# Patient Record
Sex: FEMALE | HISPANIC OR LATINO | Employment: OTHER | URBAN - METROPOLITAN AREA
[De-identification: names, ages, dates, MRNs, and addresses within clinical notes are randomized per-mention and may not be internally consistent; named-entity substitution may affect disease eponyms.]

---

## 2017-06-08 ENCOUNTER — PATIENT OUTREACH (OUTPATIENT)
Dept: HEALTH INFORMATION MANAGEMENT | Facility: OTHER | Age: 72
End: 2017-06-08

## 2021-01-14 DIAGNOSIS — Z23 NEED FOR VACCINATION: ICD-10-CM

## 2024-06-13 ENCOUNTER — OFFICE VISIT (OUTPATIENT)
Dept: MEDICAL GROUP | Age: 79
End: 2024-06-13
Payer: MEDICARE

## 2024-06-13 VITALS
HEIGHT: 64 IN | SYSTOLIC BLOOD PRESSURE: 140 MMHG | DIASTOLIC BLOOD PRESSURE: 60 MMHG | WEIGHT: 122 LBS | HEART RATE: 56 BPM | OXYGEN SATURATION: 99 % | BODY MASS INDEX: 20.83 KG/M2 | TEMPERATURE: 97.3 F

## 2024-06-13 DIAGNOSIS — Z00.00 BLOOD TESTS FOR ROUTINE GENERAL PHYSICAL EXAMINATION: ICD-10-CM

## 2024-06-13 DIAGNOSIS — Z78.0 POSTMENOPAUSAL STATUS (AGE-RELATED) (NATURAL): ICD-10-CM

## 2024-06-13 DIAGNOSIS — N95.1 MENOPAUSAL STATE: ICD-10-CM

## 2024-06-13 DIAGNOSIS — I10 ESSENTIAL HYPERTENSION: ICD-10-CM

## 2024-06-13 DIAGNOSIS — M19.90 ARTHRITIS: ICD-10-CM

## 2024-06-13 DIAGNOSIS — D33.3 SCHWANNOMA OF CRANIAL NERVE (HCC): ICD-10-CM

## 2024-06-13 DIAGNOSIS — Z11.59 NEED FOR HEPATITIS C SCREENING TEST: ICD-10-CM

## 2024-06-13 DIAGNOSIS — E78.2 MIXED HYPERLIPIDEMIA: ICD-10-CM

## 2024-06-13 DIAGNOSIS — C16.8 MALIGNANT NEOPLASM OF OVERLAPPING SITES OF STOMACH (HCC): ICD-10-CM

## 2024-06-13 PROCEDURE — 3077F SYST BP >= 140 MM HG: CPT | Performed by: STUDENT IN AN ORGANIZED HEALTH CARE EDUCATION/TRAINING PROGRAM

## 2024-06-13 PROCEDURE — 99204 OFFICE O/P NEW MOD 45 MIN: CPT | Performed by: STUDENT IN AN ORGANIZED HEALTH CARE EDUCATION/TRAINING PROGRAM

## 2024-06-13 PROCEDURE — 3078F DIAST BP <80 MM HG: CPT | Performed by: STUDENT IN AN ORGANIZED HEALTH CARE EDUCATION/TRAINING PROGRAM

## 2024-06-13 ASSESSMENT — ENCOUNTER SYMPTOMS
ORTHOPNEA: 0
BLOOD IN STOOL: 0
DEPRESSION: 0
DOUBLE VISION: 0
NECK PAIN: 0
HEADACHES: 0
MYALGIAS: 0
COUGH: 0
ABDOMINAL PAIN: 0
HEARTBURN: 0
BACK PAIN: 0
CHILLS: 0
FEVER: 0
BRUISES/BLEEDS EASILY: 0
WHEEZING: 0
DIZZINESS: 0
SHORTNESS OF BREATH: 0
BLURRED VISION: 0
WEAKNESS: 0
PALPITATIONS: 0

## 2024-06-13 ASSESSMENT — PATIENT HEALTH QUESTIONNAIRE - PHQ9: CLINICAL INTERPRETATION OF PHQ2 SCORE: 0

## 2024-06-13 NOTE — PROGRESS NOTES
Subjective:     CC: Establish care      HISTORY OF THE PRESENT ILLNESS:   Patient is a 78 y.o. female. This pleasant patient is here today to establish care and discuss screening, immunizations and chronic medical conditions.  Patient has a past medical history of schwannoma status post resection in 2017, gastric cancer status post partial gastrectomy and radiation in 2021, hypertension currently treated with diet and exercise, osteoarthritis of hands and knees, mixed dyslipidemia currently not taking any medical therapy, and impaired fasting glucose.  Patient states that she was seen previously by medical group for many years, however she moved to [facility] several years ago and she came back to the  for permanent residency.  The last time she was seen by her previous PCP within [facility] group was in 2016.  She states that she was getting medical care she had a, however she has not had done any routine lab work in the past year or so.  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  She also stated that at times she has had joint pains, however this pain is not very frequent and she thought that she had rheumatoid arthritis, however previous testing was negative.  She also was worried about poor nutrition, however her weight is healthy and she has been in this current weight for years.    ROS:   Review of Systems   Constitutional:  Negative for chills, fever and malaise/fatigue.   HENT:  Negative for nosebleeds and tinnitus.    Eyes:  Negative for blurred vision and double vision.   Respiratory:  Negative for cough, shortness of breath and wheezing.    Cardiovascular:  Negative for chest pain, palpitations, orthopnea and leg swelling.   Gastrointestinal:  Negative for abdominal pain, blood in stool, heartburn and  "melena.   Genitourinary:  Negative for dysuria, frequency and urgency.   Musculoskeletal:  Negative for back pain, myalgias and neck pain.   Skin:  Negative for rash.   Neurological:  Negative for dizziness, weakness and headaches.   Endo/Heme/Allergies:  Does not bruise/bleed easily.   Psychiatric/Behavioral:  Negative for depression and suicidal ideas.          Objective:     Exam: BP (!) 146/60 (BP Location: Right arm, Patient Position: Sitting, BP Cuff Size: Adult)   Pulse (!) 56   Temp 36.3 °C (97.3 °F) (Temporal)   Ht 1.613 m (5' 3.5\")   Wt 55.3 kg (122 lb)   SpO2 99%  Body mass index is 21.27 kg/m².    Physical Exam  Vitals reviewed.   Constitutional:       General: She is not in acute distress.  HENT:      Head: Normocephalic and atraumatic.      Right Ear: Tympanic membrane and ear canal normal.      Left Ear: Tympanic membrane and ear canal normal.      Mouth/Throat:      Mouth: Mucous membranes are moist.      Pharynx: No oropharyngeal exudate.   Eyes:      General: No scleral icterus.     Extraocular Movements: Extraocular movements intact.      Pupils: Pupils are equal, round, and reactive to light.   Cardiovascular:      Rate and Rhythm: Normal rate and regular rhythm.      Pulses: Normal pulses.      Heart sounds: Normal heart sounds. No murmur heard.  Pulmonary:      Effort: Pulmonary effort is normal. No respiratory distress.      Breath sounds: Normal breath sounds. No wheezing.   Abdominal:      General: There is no distension.      Palpations: Abdomen is soft.      Tenderness: There is no abdominal tenderness. There is no guarding or rebound.   Musculoskeletal:      Cervical back: Normal range of motion and neck supple.      Right lower leg: No edema.      Left lower leg: No edema.   Lymphadenopathy:      Cervical: No cervical adenopathy.   Skin:     Capillary Refill: Capillary refill takes less than 2 seconds.      Coloration: Skin is not jaundiced.      Findings: No bruising. "   Neurological:      General: No focal deficit present.      Mental Status: She is alert.      Cranial Nerves: No cranial nerve deficit.   Psychiatric:         Mood and Affect: Mood normal.         Behavior: Behavior normal.       Labs: Ordered    Assessment & Plan:   78 y.o. female with the following -    1. Malignant neoplasm of overlapping sites of stomach (HCC)  -Status post radiation and partial gastrectomy in 2021  -In remission  -Currently not following up with oncology    2. Arthritis  -History of mild osteoarthritis in distal phalanges and knees  -In the past worked up for rheumatoid arthritis.  Results were negative  -However patient was told she will that she had rheumatoid arthritis 2 or 3 years ago  -Patient is not having persistent or significant joint pain, she does not have either swollen joints, morning stiffness.  -I have very low suspicious for rheumatoid arthritis diagnosis, however patient and her daughter want to be tested for it    - RHEUMATOID ARTHRITIS FACTOR; Future    3. Essential hypertension  -Chronic, stable  -In the past she used to take losartan 50 mg daily, however she has now been taking this medication for quite some time and apparently has been well-controlled with diet and exercise  -I recommended to the patient to check her blood pressure daily and bring blood pressure log to her next visit  -Blood pressure today at our office was borderline elevated    4. Mixed hyperlipidemia  -Chronic, not treated  -Patient is not taking any medications currently  -No recent labs  -Recommended repeat lipid profile    5. Schwannoma of cranial nerve (HCC)  -S/p resection in 2011  -Patient has significant hearing loss in her left ear secondary to surgery    6. Postmenopausal status (age-related) (natural)    7. Menopausal state  -Due for screening  - DS-BONE DENSITY STUDY (DEXA); Future    8. Blood tests for routine general physical examination  -Appropriate lab work  - CBC WITH DIFFERENTIAL;  Future  - Comp Metabolic Panel; Future  - HEMOGLOBIN A1C; Future  - Lipid Profile; Future  - VITAMIN D,25 HYDROXY (DEFICIENCY); Future  - TSH; Future    9. Need for hepatitis C screening test  -Due for screening  - HEP C VIRUS ANTIBODY; Future     HCC Gap Form    Diagnosis to address: C80.1 - Cancer (HCC)  Assessment and plan: Chronic, stable, as based on today's assessment and impact on other conditions evaluated today. Continue with current treatment plan:  Follow-up with specialist as directed, but at least annually.  Last edited 06/13/24 10:11 PDT by Pranay Lewis M.D.       Return in about 2 weeks (around 6/27/2024) for Labs.    Please note that this dictation was created using voice recognition software. I have made every reasonable attempt to correct obvious errors, but I expect that there are errors of grammar and possibly content that I did not discover before finalizing the note.

## 2024-06-22 ENCOUNTER — HOSPITAL ENCOUNTER (OUTPATIENT)
Facility: MEDICAL CENTER | Age: 79
End: 2024-06-22
Payer: MEDICARE

## 2024-06-22 ENCOUNTER — OFFICE VISIT (OUTPATIENT)
Dept: URGENT CARE | Facility: CLINIC | Age: 79
End: 2024-06-22
Payer: MEDICARE

## 2024-06-22 ENCOUNTER — APPOINTMENT (OUTPATIENT)
Dept: RADIOLOGY | Facility: IMAGING CENTER | Age: 79
End: 2024-06-22
Payer: MEDICARE

## 2024-06-22 VITALS
OXYGEN SATURATION: 97 % | WEIGHT: 124 LBS | SYSTOLIC BLOOD PRESSURE: 168 MMHG | BODY MASS INDEX: 20.66 KG/M2 | RESPIRATION RATE: 16 BRPM | HEART RATE: 67 BPM | DIASTOLIC BLOOD PRESSURE: 104 MMHG | TEMPERATURE: 97.8 F | HEIGHT: 65 IN

## 2024-06-22 DIAGNOSIS — M43.6 TORTICOLLIS, ACUTE: ICD-10-CM

## 2024-06-22 DIAGNOSIS — M54.2 BILATERAL POSTERIOR NECK PAIN: ICD-10-CM

## 2024-06-22 DIAGNOSIS — I10 ELEVATED BLOOD PRESSURE READING IN OFFICE WITH DIAGNOSIS OF HYPERTENSION: ICD-10-CM

## 2024-06-22 DIAGNOSIS — S16.1XXA ACUTE STRAIN OF NECK MUSCLE, INITIAL ENCOUNTER: ICD-10-CM

## 2024-06-22 DIAGNOSIS — S16.1XXA ACUTE STRAIN OF NECK MUSCLE, INITIAL ENCOUNTER: Primary | ICD-10-CM

## 2024-06-22 LAB
ALBUMIN SERPL BCP-MCNC: 3.8 G/DL (ref 3.2–4.9)
ALBUMIN/GLOB SERPL: 1.1 G/DL
ALP SERPL-CCNC: 186 U/L (ref 30–99)
ALT SERPL-CCNC: 15 U/L (ref 2–50)
ANION GAP SERPL CALC-SCNC: 12 MMOL/L (ref 7–16)
AST SERPL-CCNC: 19 U/L (ref 12–45)
BILIRUB SERPL-MCNC: 0.2 MG/DL (ref 0.1–1.5)
BUN SERPL-MCNC: 13 MG/DL (ref 8–22)
CALCIUM ALBUM COR SERPL-MCNC: 9.4 MG/DL (ref 8.5–10.5)
CALCIUM SERPL-MCNC: 9.2 MG/DL (ref 8.5–10.5)
CHLORIDE SERPL-SCNC: 104 MMOL/L (ref 96–112)
CO2 SERPL-SCNC: 24 MMOL/L (ref 20–33)
CREAT SERPL-MCNC: 0.7 MG/DL (ref 0.5–1.4)
GFR SERPLBLD CREATININE-BSD FMLA CKD-EPI: 87 ML/MIN/1.73 M 2
GLOBULIN SER CALC-MCNC: 3.6 G/DL (ref 1.9–3.5)
GLUCOSE SERPL-MCNC: 101 MG/DL (ref 65–99)
POTASSIUM SERPL-SCNC: 4.4 MMOL/L (ref 3.6–5.5)
PROT SERPL-MCNC: 7.4 G/DL (ref 6–8.2)
SODIUM SERPL-SCNC: 140 MMOL/L (ref 135–145)

## 2024-06-22 PROCEDURE — 3077F SYST BP >= 140 MM HG: CPT

## 2024-06-22 PROCEDURE — 72040 X-RAY EXAM NECK SPINE 2-3 VW: CPT | Mod: TC

## 2024-06-22 PROCEDURE — 99214 OFFICE O/P EST MOD 30 MIN: CPT

## 2024-06-22 PROCEDURE — 80053 COMPREHEN METABOLIC PANEL: CPT

## 2024-06-22 PROCEDURE — 3080F DIAST BP >= 90 MM HG: CPT

## 2024-06-22 RX ORDER — TIZANIDINE 4 MG/1
4 TABLET ORAL 3 TIMES DAILY
Qty: 30 TABLET | Refills: 0 | Status: SHIPPED | OUTPATIENT
Start: 2024-06-22 | End: 2024-07-02

## 2024-06-22 ASSESSMENT — ENCOUNTER SYMPTOMS
EYE PAIN: 0
DIZZINESS: 0
PHOTOPHOBIA: 0
WHEEZING: 0
EYE DISCHARGE: 0
FEVER: 0
HEADACHES: 0
SHORTNESS OF BREATH: 0
ABDOMINAL PAIN: 0
PALPITATIONS: 0
BLURRED VISION: 0
EYE REDNESS: 0
SPUTUM PRODUCTION: 0
MYALGIAS: 0
CHILLS: 0
DOUBLE VISION: 0
NAUSEA: 0
DIARRHEA: 0
VOMITING: 0
FALLS: 0
NECK PAIN: 1
STRIDOR: 0
COUGH: 0
SORE THROAT: 0

## 2024-06-22 NOTE — LETTER
June 22, 2024    To Whom It May Concern:         This is confirmation that Kathy Carvajal attended her scheduled appointment with CYNTHIA Rayo on 6/22/24.  Her daughter Sydni brought her into clinic today, and Sydni will need to be excused from work today due to this.         If you have any questions please do not hesitate to call me at the phone number listed below.    Sincerely,          ERAN Rayo.  [phone]

## 2024-06-22 NOTE — PROGRESS NOTES
Subjective:   Kathy Carvajal is a 79 y.o. female who presents for Muscle Pain (X 2 weeks/ can't move neck/ pain on sides and back on neck/ hard to sleeping/ hypertension/ high BMP)          I introduced myself to the patient and informed them that I am a Family Nurse Practitioner.    Entire appointment was completed with ipad  Elijah [id number]    HPI:Kathy is an 79-year-old female who comes in today accompanied by her daughter, with c/o neck pain and muscle spasms. Onset was 2 weeks ago.,  States she was getting into her grandsons truck, reached up to hold the assist bar when she was getting into the truck, lost her footing and ended up swinging momentarily while holding onto the bar, feels like she stretched her neck at that time.  She states over the next 2 days the pain started getting worse, has persisted and has worsened to the point where she now cannot turn her head left or right without getting severe muscle spasms of the back of her neck.  Patient describes symptoms as constant. They describe the pain as sharp, muscle spasms. Aggravating factors include moving her head. Relieving factors include rest and lying flat on her back. Treatments tried at home include none. They describe their symptoms as moderate to severe.  Elevated blood pressure in clinic today, she states she has not been taking her Losartan because she thinks it has been giving her the muscle cramps.  She denies any other fall or trauma/injury that could account for her symptoms.      Review of Systems   Constitutional:  Negative for chills, fever and malaise/fatigue.   HENT:  Negative for congestion, ear pain and sore throat.    Eyes:  Negative for blurred vision, double vision, photophobia, pain, discharge and redness.   Respiratory:  Negative for cough, sputum production, shortness of breath, wheezing and stridor.    Cardiovascular:  Negative for chest pain, palpitations and leg swelling.   Gastrointestinal:  Negative for  "abdominal pain, diarrhea, nausea and vomiting.   Genitourinary:  Negative for dysuria.   Musculoskeletal:  Positive for neck pain. Negative for falls and myalgias.   Skin:  Negative for rash.   Neurological:  Negative for dizziness and headaches.       Medications: This patient does not have an active medication from one of the medication groupers.     Allergies: Nkda [no known drug allergy]    Problem List: does not have any pertinent problems on file.    Surgical History:  Past Surgical History:   Procedure Laterality Date    GASTROSCOPY WITH APC  6/6/08    Performed by EVELINE SUAREZ at SURGERY [facility]    EGD W/ENDOSCOPIC ULTRASOUND  6/6/08    Performed by EVELINE SUAREZ at [facility]    EYE SURGERY  2008    OTHER  7/07    tumors removed both eyes    CHOLECYSTECTOMY  6/07    OTHER      cataract surgery.     PRIMARY C SECTION         Past Social Hx:   reports that she quit smoking about 31 years ago. Her smoking use included cigarettes. She started smoking about 33 years ago. She has a 2 pack-year smoking history. She has never used smokeless tobacco. She reports that she does not drink alcohol and does not use drugs.     Past Family Hx:   family history includes Cancer in her brother, father, and another family member; Lung Disease in her mother.     Problem list, medications, and allergies reviewed by myself today in Epic.   I have documented what I find to be significant in regards to past medical, social, family and surgical history  in my HPI or under PMH/PSH/FH review section, otherwise it is noncontributory     Objective:     BP (!) 168/104   Pulse 67   Temp 36.6 °C (97.8 °F)   Resp 16   Ht 1.651 m (5' 5\")   Wt 56.2 kg (124 lb)   SpO2 97%   BMI 20.63 kg/m²     During this visit, appropriate PPE was worn, and hand hygiene was performed.    Physical Exam  Vitals reviewed.   Constitutional:       General: She is not in acute distress.     Appearance: Normal " appearance. She is normal weight. She is not ill-appearing or toxic-appearing.   HENT:      Head: Normocephalic and atraumatic.      Right Ear: Tympanic membrane, ear canal and external ear normal. There is no impacted cerumen.      Left Ear: Tympanic membrane, ear canal and external ear normal. There is no impacted cerumen.      Nose: No congestion or rhinorrhea.   Eyes:      General: No scleral icterus.        Right eye: No discharge.         Left eye: No discharge.      Extraocular Movements: Extraocular movements intact.      Conjunctiva/sclera: Conjunctivae normal.   Neck:      Thyroid: No thyroid mass, thyromegaly or thyroid tenderness.      Vascular: No carotid bruit.      Trachea: Trachea normal.   Cardiovascular:      Rate and Rhythm: Normal rate.   Pulmonary:      Effort: Pulmonary effort is normal.   Abdominal:      General: There is no distension.   Genitourinary:     Comments: Deferred  Musculoskeletal:         General: No swelling.      Cervical back: Rigidity, torticollis and tenderness present. No edema, erythema, signs of trauma or crepitus. Pain with movement and muscular tenderness present. No spinous process tenderness. Decreased range of motion.      Right lower leg: No edema.      Left lower leg: No edema.   Lymphadenopathy:      Cervical: No cervical adenopathy.   Skin:     General: Skin is warm and dry.   Neurological:      General: No focal deficit present.      Mental Status: She is alert and oriented to person, place, and time. Mental status is at baseline.   Psychiatric:         Mood and Affect: Mood normal.         Behavior: Behavior normal.       RADIOLOGY RESULTS   DX-CERVICAL SPINE-2 OR 3 VIEWS    Result Date: 6/22/2024 6/22/2024 11:32 AM HISTORY/REASON FOR EXAM:  Neck pain. TECHNIQUE/EXAM DESCRIPTION AND NUMBER OF VIEWS: Cervical spine series, 2 views. COMPARISON:  None. FINDINGS: Preserved cervical lordosis. No acute fracture or traumatic listhesis. Minimal cervical spondylosis,  most pronounced at C5-C7 levels. Mild bilateral facet hypertrophy. No acute soft tissue abnormality is identified.     Minimal cervical spondylosis. No acute fracture or listhesis in the cervical spine.          Hospital Outpatient Visit on 06/22/2024   Component Date Value Ref Range Status    Sodium 06/22/2024 140  135 - 145 mmol/L Final    Potassium 06/22/2024 4.4  3.6 - 5.5 mmol/L Final    Chloride 06/22/2024 104  96 - 112 mmol/L Final    Co2 06/22/2024 24  20 - 33 mmol/L Final    Anion Gap 06/22/2024 12.0  7.0 - 16.0 Final    Glucose 06/22/2024 101 (H)  65 - 99 mg/dL Final    Bun 06/22/2024 13  8 - 22 mg/dL Final    Creatinine 06/22/2024 0.70  0.50 - 1.40 mg/dL Final    Calcium 06/22/2024 9.2  8.5 - 10.5 mg/dL Final    Correct Calcium 06/22/2024 9.4  8.5 - 10.5 mg/dL Final    AST(SGOT) 06/22/2024 19  12 - 45 U/L Final    ALT(SGPT) 06/22/2024 15  2 - 50 U/L Final    Alkaline Phosphatase 06/22/2024 186 (H)  30 - 99 U/L Final    Total Bilirubin 06/22/2024 0.2  0.1 - 1.5 mg/dL Final    Albumin 06/22/2024 3.8  3.2 - 4.9 g/dL Final    Total Protein 06/22/2024 7.4  6.0 - 8.2 g/dL Final    Globulin 06/22/2024 3.6 (H)  1.9 - 3.5 g/dL Final    A-G Ratio 06/22/2024 1.1  g/dL Final    GFR (CKD-EPI) 06/22/2024 87  >60 mL/min/1.73 m 2 Final    Comment: Estimated Glomerular Filtration Rate is calculated using  race neutral CKD-EPI 2021 equation per NKF-ASN recommendations.        Assessment/Plan:     Diagnosis and associated orders:     1. Acute strain of neck muscle, initial encounter  Comp Metabolic Panel    tizanidine (ZANAFLEX) 4 MG Tab    DX-CERVICAL SPINE-2 OR 3 VIEWS      2. Elevated blood pressure reading in office with diagnosis of hypertension        3. Bilateral posterior neck pain           Comments/MDM:     1. Acute strain of neck muscle, initial encounter  Discussed with patient/family Dx,  DDx, management options (risks,benefits, and alternatives to planned treatment), natural progression.  I did go over the  x-rays with patient.  Discussed with patient and her daughter that I want to check her liver and kidney function, we will get blood in the clinic send it stat to the lab I should get the results in a couple of hours I will call them and let them know.  If kidneys and liver function are good they may take the medicine.  We may have to adjust the dose based on the results.  Patient and her daughter have good understanding  Did notify patient's daughter that kidney/hepatic function looks good and she may take her medicine without any dose adjustments  Questions were encouraged and answered  Supportive care measures were discussed including the following -   RICE  Tylenol and ibuprofen for pain and inflammation, discussed appropriate dosages.  Instructed patient and daughter regarding tizanidine, purpose, side effects, precautions including it may make her sleepy, increased fall precautions.  They state they understand  Instructed patient ongoing assessment of color, movement, sensation  Discussed red flags and reasons return to urgent care versus when to go to the emergency room for increased pain, loss of color, movement, or           sensation to the digits, any increased redness, swelling, localized heat, especially if there is fever, chills, nausea and vomiting, lethargy.    discussed with patient/family  that I have made a referral to  sports medicine for follow-up, encouraged to call to make an appointment, phone number and location provided    - Comp Metabolic Panel; Future  - tizanidine (ZANAFLEX) 4 MG Tab; Take 1 Tablet by mouth 3 times a day for 10 days.  Dispense: 30 Tablet; Refill: 0  - DX-CERVICAL SPINE-2 OR 3 VIEWS; Future    2. Elevated blood pressure reading in office with diagnosis of hypertension  I instructed patient regarding the long-term risks of having uncontrolled high blood pressure including cardiovascular disease, increased risk of PVD, MI, CVA. I instructed patient to get a home BP monitor  from pharmacy, check their blood pressure twice a day, morning and evening, keep a written log, make an appointment to see PCP and take the log of blood pressures with them for PCP to review to decide whether antihypertensive therapy is indicated.  Patient states they understand instructions and will do so.      3. Bilateral posterior neck pain  - Comp Metabolic Panel; Future  - tizanidine (ZANAFLEX) 4 MG Tab; Take 1 Tablet by mouth 3 times a day for 10 days.  Dispense: 30 Tablet; Refill: 0  - DX-CERVICAL SPINE-2 OR 3 VIEWS; Future           Pt is clinically stable at today's acute urgent care visit. Vital signs are normal and reassuring.  No acute distress noted. Appropriate for outpatient management at this time.        I personally reviewed prior external notes and test results pertinent to today's visit.  I have independently reviewed and interpreted all diagnostics ordered during this urgent care acute visit.        Please note that this dictation was created using voice recognition software. I have made a reasonable attempt to correct obvious errors, but I expect that there are errors of grammar and possibly content that I did not discover before finalizing the note.    This note was electronically signed by Stephan HUGHES, SARAH, FRED, ALEJANDRO

## 2024-06-29 ENCOUNTER — HOSPITAL ENCOUNTER (OUTPATIENT)
Facility: MEDICAL CENTER | Age: 79
End: 2024-06-29
Attending: FAMILY MEDICINE
Payer: MEDICARE

## 2024-06-29 ENCOUNTER — OFFICE VISIT (OUTPATIENT)
Dept: URGENT CARE | Facility: CLINIC | Age: 79
End: 2024-06-29
Payer: MEDICARE

## 2024-06-29 VITALS
HEART RATE: 60 BPM | HEIGHT: 65 IN | RESPIRATION RATE: 14 BRPM | BODY MASS INDEX: 21.23 KG/M2 | TEMPERATURE: 97.9 F | OXYGEN SATURATION: 98 % | SYSTOLIC BLOOD PRESSURE: 164 MMHG | WEIGHT: 127.4 LBS | DIASTOLIC BLOOD PRESSURE: 82 MMHG

## 2024-06-29 DIAGNOSIS — I10 ESSENTIAL HYPERTENSION: ICD-10-CM

## 2024-06-29 DIAGNOSIS — N30.01 ACUTE CYSTITIS WITH HEMATURIA: ICD-10-CM

## 2024-06-29 LAB
APPEARANCE UR: CLEAR
BILIRUB UR STRIP-MCNC: NEGATIVE MG/DL
COLOR UR AUTO: YELLOW
GLUCOSE UR STRIP.AUTO-MCNC: NEGATIVE MG/DL
KETONES UR STRIP.AUTO-MCNC: NEGATIVE MG/DL
LEUKOCYTE ESTERASE UR QL STRIP.AUTO: NORMAL
NITRITE UR QL STRIP.AUTO: POSITIVE
PH UR STRIP.AUTO: 5.5 [PH] (ref 5–8)
PROT UR QL STRIP: NEGATIVE MG/DL
RBC UR QL AUTO: NORMAL
SP GR UR STRIP.AUTO: <=1.005
UROBILINOGEN UR STRIP-MCNC: NORMAL MG/DL

## 2024-06-29 PROCEDURE — 87086 URINE CULTURE/COLONY COUNT: CPT

## 2024-06-29 RX ORDER — CEFDINIR 300 MG/1
300 CAPSULE ORAL 2 TIMES DAILY
Qty: 10 CAPSULE | Refills: 0 | Status: SHIPPED | OUTPATIENT
Start: 2024-06-29 | End: 2024-07-04

## 2024-06-29 RX ORDER — HYDROCHLOROTHIAZIDE 12.5 MG/1
12.5 TABLET ORAL DAILY
Qty: 30 TABLET | Refills: 0 | Status: SHIPPED | OUTPATIENT
Start: 2024-06-29

## 2024-06-29 NOTE — PROGRESS NOTES
Subjective     Kathy Carvajal is a 79 y.o. female who presents with Hypertension Follow-up (Has had high blood pressure for a while and doesn't have a primary care appt till July 22 is getting blood work done on the 12 )    - (Hx taken w/ Slovak ipad ) This is a very pleasant 79 y.o. who has come to the walk-in clinic today for blood pressure running high this week.  Says her systolic blood pressure has been 1 70-1 80s in the morning.  Says she takes 50 mg of losartan twice a day and the last 3 days has been taking it 3 times a day.  She gets this medication from [facility], has seen primary care here once and says they are not aware of or did not give her any blood pressure medicine.          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, follow up by MRI every year in [facility], last one 7/2009    Schwannoma     ear- gamma knife, left per patient, unable to hear on left    Screening mammogram 2006    Pt refuses to have more mammograms or pap smears    Thyroid nodule     Tinnitus         PSH:  Past Surgical History:   Procedure Laterality Date    GASTROSCOPY WITH APC  6/6/08    Performed by EVELINE SUAREZ at [facility]    EGD W/ENDOSCOPIC ULTRASOUND  6/6/08    Performed by EVELINE SUAREZ at [facility]    EYE SURGERY  2008    OTHER  7/07    tumors removed both eyes    CHOLECYSTECTOMY  6/07    OTHER      cataract surgery.     PRIMARY C SECTION         MEDS:    Current Outpatient Medications:     cefdinir (OMNICEF) 300 MG Cap, Take 1 Capsule by mouth 2 times a day for 5 days., Disp: 10 Capsule, Rfl: 0    hydroCHLOROthiazide 12.5 MG tablet, Take 1 Tablet by mouth every day., Disp: 30 Tablet, Rfl: 0    tizanidine (ZANAFLEX) 4 MG Tab, Take 1 Tablet by mouth 3 times a day for 10  "days., Disp: 30 Tablet, Rfl: 0    ** I have documented what I find to be significant in regards to past medical, social, family and surgical history  in my HPI or under PMH/PSH/FH review section, otherwise it is noncontributory **           HPI    Review of Systems   Constitutional:         Blood pressure high    All other systems reviewed and are negative.             Objective     BP (!) 164/82   Pulse 60   Temp 36.6 °C (97.9 °F) (Temporal)   Resp 14   Ht 1.651 m (5' 5\")   Wt 57.8 kg (127 lb 6.4 oz)   SpO2 98%   BMI 21.20 kg/m²      BP recheck 164/82    Physical Exam  Vitals and nursing note reviewed.   Constitutional:       General: She is not in acute distress.     Appearance: Normal appearance. She is well-developed. She is not ill-appearing, toxic-appearing or diaphoretic.   HENT:      Head: Normocephalic.      Mouth/Throat:      Mouth: Mucous membranes are moist.   Cardiovascular:      Rate and Rhythm: Normal rate and regular rhythm.   Pulmonary:      Effort: Pulmonary effort is normal. No respiratory distress.   Neurological:      Mental Status: She is alert.      Motor: No abnormal muscle tone.   Psychiatric:         Mood and Affect: Mood normal.         Behavior: Behavior normal.         Assessment & Plan     1. Uncontrolled HTN  POCT Urinalysis    hydroCHLOROthiazide 12.5 MG tablet      2. Acute cystitis with hematuria  URINE CULTURE(NEW)    cefdinir (OMNICEF) 300 MG Cap          Uncontrolled hypertension.  Discussed she should not take her 50 mg of losartan 3 times a day as this is over the maximum limit.  She started taking it twice a day week ago.  She has not discussed any of this with her PCP and is self adjusting her medication.  Discussed to take her losartan 50 mg just once a day as prescribed I will add a low-dose of HCTZ to start today.  Any future blood pressure readings greater than 180/110 she should go to ER    Acute urinary urinary tract infection will treat per assessment and " plan      - Dx, plan & d/c instructions discussed   - Rest, stay hydrated  - low sodium diet  -Monitor blood pressure 3 times a day once in morning twice in the afternoon and keep log and bring for PCP in a week  - E.R. precautions discussed     Follow up with your regular primary care providers office within a week to keep them updated and informed of this visit and for regular routine health maintenance check-ups. ER if not improving in 2-3 days or if feeling/getting worse. (If you do not have a primary care provider and need to schedule one you may call [facility] at [phone] to do this).    Patient left in stable condition       Discussed if any testing, labs or imaging studies are obtained outside of the [facility] facility, it is their responsibility to contact the Urgent Care and let us know that it was done and get us the results so adequate follow up can be initiated    Pertinent prior lab work and/or imaging studies in Epic have been reviewed by me today on day of this visit and taken into account for my treatment and plan today    Pertinent PMH/PSH and/or chronic conditions and medications if any were reviewed today and taken into account for my treatment and plan today    Pertinent prior office visit notes in [facility] have been reviewed by me today on day of this visit.    Please note that this dictation may have been created using voice recognition software, if so I have made every reasonable attempt to correct obvious errors, but I expect that there are errors of grammar and possibly content that I did not discover before finalizing the note.

## 2024-06-29 NOTE — PROGRESS NOTES
"Subjective     Kathy Carvajal is a 79 y.o. female who presents with Hypertension Follow-up (Has had high blood pressure for a while and doesn't have a primary care appt till July 22 is getting blood work done on the 12 )            HPI    Review of Systems   All other systems reviewed and are negative.             Objective     BP (!) 148/78 (BP Location: Left arm, Patient Position: Sitting)   Pulse 60   Temp 36.6 °C (97.9 °F) (Temporal)   Resp 14   Ht 1.651 m (5' 5\")   Wt 57.8 kg (127 lb 6.4 oz)   SpO2 98%   BMI 21.20 kg/m²      Physical Exam  Vitals and nursing note reviewed.   Constitutional:       General: She is not in acute distress.     Appearance: Normal appearance. She is well-developed.   HENT:      Head: Normocephalic.   Cardiovascular:      Rate and Rhythm: Normal rate and regular rhythm.   Pulmonary:      Effort: Pulmonary effort is normal. No respiratory distress.      Breath sounds: Normal breath sounds.   Neurological:      Mental Status: She is alert.      Motor: No abnormal muscle tone.   Psychiatric:         Mood and Affect: Mood normal.         Behavior: Behavior normal.                             Assessment & Plan     1. Essential hypertension            - Dx, plan & d/c instructions discussed   - Rest, stay hydrated, OTC Motrin and/or Tylenol as needed  - E.R. precautions discussed     Follow up with your regular primary care providers office within a week to keep them updated and informed of this visit and for regular routine health maintenance check-ups. ER if not improving in 2-3 days or if feeling/getting worse. (If you do not have a primary care provider and need to schedule one you may call [facility] at [phone] to do this).    Patient left in stable condition     Any realistic side effects of medications that may have been given today reviewed.     POCT results reviewed/discussed     reviewed if narcotics given     Today's radiology imaging personally reviewed by me today " on day of visit and Radiology readings reviewed and discussed w/ patient today.    Discussed if any testing, labs or imaging studies are obtained outside of the [facility] facility, it is their responsibility to contact the Urgent Care and let us know that it was done and get us the results so adequate follow up can be initiated    Pertinent prior lab work and/or imaging studies in Epic have been reviewed by me today on day of this visit and taken into account for my treatment and plan today    Pertinent PMH/PSH and/or chronic conditions and medications if any were reviewed today and taken into account for my treatment and plan today    Pertinent prior office visit notes in [facility] have been reviewed by me today on day of this visit.    Please note that this dictation may have been created using voice recognition software, if so I have made every reasonable attempt to correct obvious errors, but I expect that there are errors of grammar and possibly content that I did not discover before finalizing the note.

## 2024-06-30 LAB
BACTERIA UR CULT: NORMAL
SIGNIFICANT IND 70042: NORMAL
SITE SITE: NORMAL
SOURCE SOURCE: NORMAL

## 2024-07-01 LAB
BACTERIA UR CULT: NORMAL
SIGNIFICANT IND 70042: NORMAL
SITE SITE: NORMAL
SOURCE SOURCE: NORMAL

## 2024-07-12 ENCOUNTER — HOSPITAL ENCOUNTER (OUTPATIENT)
Dept: LAB | Facility: MEDICAL CENTER | Age: 79
End: 2024-07-12
Attending: STUDENT IN AN ORGANIZED HEALTH CARE EDUCATION/TRAINING PROGRAM
Payer: MEDICARE

## 2024-07-12 ENCOUNTER — HOSPITAL ENCOUNTER (OUTPATIENT)
Dept: RADIOLOGY | Facility: MEDICAL CENTER | Age: 79
End: 2024-07-12
Attending: STUDENT IN AN ORGANIZED HEALTH CARE EDUCATION/TRAINING PROGRAM
Payer: MEDICARE

## 2024-07-12 DIAGNOSIS — Z78.0 POSTMENOPAUSAL STATUS (AGE-RELATED) (NATURAL): ICD-10-CM

## 2024-07-12 DIAGNOSIS — Z11.59 NEED FOR HEPATITIS C SCREENING TEST: ICD-10-CM

## 2024-07-12 DIAGNOSIS — M19.90 ARTHRITIS: ICD-10-CM

## 2024-07-12 DIAGNOSIS — N95.1 MENOPAUSAL STATE: ICD-10-CM

## 2024-07-12 DIAGNOSIS — Z00.00 BLOOD TESTS FOR ROUTINE GENERAL PHYSICAL EXAMINATION: ICD-10-CM

## 2024-07-12 LAB
25(OH)D3 SERPL-MCNC: 25 NG/ML (ref 30–100)
ALBUMIN SERPL BCP-MCNC: 4.1 G/DL (ref 3.2–4.9)
ALBUMIN/GLOB SERPL: 1.2 G/DL
ALP SERPL-CCNC: 197 U/L (ref 30–99)
ALT SERPL-CCNC: 14 U/L (ref 2–50)
ANION GAP SERPL CALC-SCNC: 13 MMOL/L (ref 7–16)
AST SERPL-CCNC: 23 U/L (ref 12–45)
BASOPHILS # BLD AUTO: 0.6 % (ref 0–1.8)
BASOPHILS # BLD: 0.04 K/UL (ref 0–0.12)
BILIRUB SERPL-MCNC: 0.3 MG/DL (ref 0.1–1.5)
BUN SERPL-MCNC: 20 MG/DL (ref 8–22)
CALCIUM ALBUM COR SERPL-MCNC: 9.3 MG/DL (ref 8.5–10.5)
CALCIUM SERPL-MCNC: 9.4 MG/DL (ref 8.5–10.5)
CHLORIDE SERPL-SCNC: 100 MMOL/L (ref 96–112)
CHOLEST SERPL-MCNC: 226 MG/DL (ref 100–199)
CO2 SERPL-SCNC: 26 MMOL/L (ref 20–33)
CREAT SERPL-MCNC: 0.82 MG/DL (ref 0.5–1.4)
EOSINOPHIL # BLD AUTO: 0.16 K/UL (ref 0–0.51)
EOSINOPHIL NFR BLD: 2.3 % (ref 0–6.9)
ERYTHROCYTE [DISTWIDTH] IN BLOOD BY AUTOMATED COUNT: 43.4 FL (ref 35.9–50)
EST. AVERAGE GLUCOSE BLD GHB EST-MCNC: 131 MG/DL
GFR SERPLBLD CREATININE-BSD FMLA CKD-EPI: 72 ML/MIN/1.73 M 2
GLOBULIN SER CALC-MCNC: 3.3 G/DL (ref 1.9–3.5)
GLUCOSE SERPL-MCNC: 103 MG/DL (ref 65–99)
HBA1C MFR BLD: 6.2 % (ref 4–5.6)
HCT VFR BLD AUTO: 34.8 % (ref 37–47)
HCV AB SER QL: NORMAL
HDLC SERPL-MCNC: 82 MG/DL
HGB BLD-MCNC: 11.2 G/DL (ref 12–16)
IMM GRANULOCYTES # BLD AUTO: 0.03 K/UL (ref 0–0.11)
IMM GRANULOCYTES NFR BLD AUTO: 0.4 % (ref 0–0.9)
LDLC SERPL CALC-MCNC: 122 MG/DL
LYMPHOCYTES # BLD AUTO: 2.09 K/UL (ref 1–4.8)
LYMPHOCYTES NFR BLD: 30.1 % (ref 22–41)
MCH RBC QN AUTO: 27.1 PG (ref 27–33)
MCHC RBC AUTO-ENTMCNC: 32.2 G/DL (ref 32.2–35.5)
MCV RBC AUTO: 84.3 FL (ref 81.4–97.8)
MONOCYTES # BLD AUTO: 0.52 K/UL (ref 0–0.85)
MONOCYTES NFR BLD AUTO: 7.5 % (ref 0–13.4)
NEUTROPHILS # BLD AUTO: 4.11 K/UL (ref 1.82–7.42)
NEUTROPHILS NFR BLD: 59.1 % (ref 44–72)
NRBC # BLD AUTO: 0 K/UL
NRBC BLD-RTO: 0 /100 WBC (ref 0–0.2)
PLATELET # BLD AUTO: 341 K/UL (ref 164–446)
PMV BLD AUTO: 9.1 FL (ref 9–12.9)
POTASSIUM SERPL-SCNC: 4.5 MMOL/L (ref 3.6–5.5)
PROT SERPL-MCNC: 7.4 G/DL (ref 6–8.2)
RBC # BLD AUTO: 4.13 M/UL (ref 4.2–5.4)
RHEUMATOID FACT SER IA-ACNC: 10 IU/ML (ref 0–14)
SODIUM SERPL-SCNC: 139 MMOL/L (ref 135–145)
TRIGL SERPL-MCNC: 108 MG/DL (ref 0–149)
TSH SERPL DL<=0.005 MIU/L-ACNC: 2.98 UIU/ML (ref 0.38–5.33)
WBC # BLD AUTO: 7 K/UL (ref 4.8–10.8)

## 2024-07-12 PROCEDURE — 86431 RHEUMATOID FACTOR QUANT: CPT

## 2024-07-12 PROCEDURE — 84443 ASSAY THYROID STIM HORMONE: CPT

## 2024-07-12 PROCEDURE — 36415 COLL VENOUS BLD VENIPUNCTURE: CPT

## 2024-07-12 PROCEDURE — 85025 COMPLETE CBC W/AUTO DIFF WBC: CPT

## 2024-07-12 PROCEDURE — 86803 HEPATITIS C AB TEST: CPT

## 2024-07-12 PROCEDURE — 82306 VITAMIN D 25 HYDROXY: CPT

## 2024-07-12 PROCEDURE — 80053 COMPREHEN METABOLIC PANEL: CPT

## 2024-07-12 PROCEDURE — 80061 LIPID PANEL: CPT

## 2024-07-12 PROCEDURE — 83036 HEMOGLOBIN GLYCOSYLATED A1C: CPT

## 2024-07-12 PROCEDURE — 77080 DXA BONE DENSITY AXIAL: CPT

## 2024-07-19 ASSESSMENT — PATIENT HEALTH QUESTIONNAIRE - PHQ9
1. LITTLE INTEREST OR PLEASURE IN DOING THINGS: NOT AT ALL
2. FEELING DOWN, DEPRESSED, IRRITABLE, OR HOPELESS: NOT AT ALL

## 2024-07-19 ASSESSMENT — ENCOUNTER SYMPTOMS: GENERAL WELL-BEING: GOOD

## 2024-07-19 ASSESSMENT — ACTIVITIES OF DAILY LIVING (ADL): BATHING_REQUIRES_ASSISTANCE: 0

## 2024-07-22 ENCOUNTER — APPOINTMENT (OUTPATIENT)
Dept: MEDICAL GROUP | Age: 79
End: 2024-07-22
Payer: MEDICARE

## 2024-07-22 VITALS
WEIGHT: 123 LBS | TEMPERATURE: 97.3 F | SYSTOLIC BLOOD PRESSURE: 132 MMHG | DIASTOLIC BLOOD PRESSURE: 66 MMHG | HEART RATE: 58 BPM | BODY MASS INDEX: 20.49 KG/M2 | OXYGEN SATURATION: 97 % | HEIGHT: 65 IN

## 2024-07-22 DIAGNOSIS — E55.9 VITAMIN D INSUFFICIENCY: ICD-10-CM

## 2024-07-22 DIAGNOSIS — D50.9 MICROCYTIC ANEMIA: ICD-10-CM

## 2024-07-22 DIAGNOSIS — M25.562 CHRONIC PAIN OF BOTH KNEES: ICD-10-CM

## 2024-07-22 DIAGNOSIS — E78.2 MIXED DYSLIPIDEMIA: ICD-10-CM

## 2024-07-22 DIAGNOSIS — G57.61 MORTON NEUROMA, RIGHT: ICD-10-CM

## 2024-07-22 DIAGNOSIS — R73.03 PREDIABETES: ICD-10-CM

## 2024-07-22 DIAGNOSIS — G89.29 CHRONIC PAIN OF BOTH KNEES: ICD-10-CM

## 2024-07-22 DIAGNOSIS — M25.561 CHRONIC PAIN OF BOTH KNEES: ICD-10-CM

## 2024-07-22 DIAGNOSIS — M85.89 OSTEOPENIA OF MULTIPLE SITES: ICD-10-CM

## 2024-07-22 PROCEDURE — 99214 OFFICE O/P EST MOD 30 MIN: CPT | Performed by: STUDENT IN AN ORGANIZED HEALTH CARE EDUCATION/TRAINING PROGRAM

## 2024-07-22 PROCEDURE — 3078F DIAST BP <80 MM HG: CPT | Performed by: STUDENT IN AN ORGANIZED HEALTH CARE EDUCATION/TRAINING PROGRAM

## 2024-07-22 PROCEDURE — 3075F SYST BP GE 130 - 139MM HG: CPT | Performed by: STUDENT IN AN ORGANIZED HEALTH CARE EDUCATION/TRAINING PROGRAM

## 2024-07-22 ASSESSMENT — ENCOUNTER SYMPTOMS
BRUISES/BLEEDS EASILY: 0
FEVER: 0
PALPITATIONS: 0
DIZZINESS: 0
CHILLS: 0
BACK PAIN: 0
DOUBLE VISION: 0
ABDOMINAL PAIN: 0
SHORTNESS OF BREATH: 0
DEPRESSION: 0
WEAKNESS: 0
BLOOD IN STOOL: 0
COUGH: 0
BLURRED VISION: 0
HEADACHES: 0

## 2024-07-22 ASSESSMENT — FIBROSIS 4 INDEX: FIB4 SCORE: 1.44

## 2024-07-23 ASSESSMENT — ENCOUNTER SYMPTOMS
STIFFNESS: 1
JOINT SWELLING: 1

## 2024-07-24 ENCOUNTER — OFFICE VISIT (OUTPATIENT)
Dept: SPORTS MEDICINE | Facility: OTHER | Age: 79
End: 2024-07-24
Payer: MEDICARE

## 2024-07-24 VITALS
OXYGEN SATURATION: 98 % | HEIGHT: 63 IN | HEART RATE: 64 BPM | DIASTOLIC BLOOD PRESSURE: 68 MMHG | BODY MASS INDEX: 21.44 KG/M2 | WEIGHT: 121 LBS | SYSTOLIC BLOOD PRESSURE: 130 MMHG | TEMPERATURE: 98.5 F

## 2024-07-24 DIAGNOSIS — G57.61 MORTON'S NEUROMA OF RIGHT FOOT: ICD-10-CM

## 2024-07-24 DIAGNOSIS — M77.41 METATARSALGIA OF RIGHT FOOT: ICD-10-CM

## 2024-07-24 PROCEDURE — 99213 OFFICE O/P EST LOW 20 MIN: CPT | Performed by: FAMILY MEDICINE

## 2024-07-24 PROCEDURE — 3078F DIAST BP <80 MM HG: CPT | Performed by: FAMILY MEDICINE

## 2024-07-24 PROCEDURE — 3075F SYST BP GE 130 - 139MM HG: CPT | Performed by: FAMILY MEDICINE

## 2024-07-24 ASSESSMENT — ENCOUNTER SYMPTOMS
TINGLING: 0
SENSORY CHANGE: 0

## 2024-07-24 ASSESSMENT — FIBROSIS 4 INDEX: FIB4 SCORE: 1.44

## 2024-07-25 PROBLEM — Z86.018 HISTORY OF BENIGN SCHWANNOMA: Status: ACTIVE | Noted: 2024-07-25

## 2024-07-26 ENCOUNTER — APPOINTMENT (OUTPATIENT)
Dept: FAMILY PLANNING/WOMEN'S HEALTH CLINIC | Facility: PHYSICIAN GROUP | Age: 79
End: 2024-07-26
Payer: MEDICARE

## 2024-07-26 DIAGNOSIS — E78.2 MIXED HYPERLIPIDEMIA: ICD-10-CM

## 2024-07-26 DIAGNOSIS — R73.03 PREDIABETES: ICD-10-CM

## 2024-07-26 DIAGNOSIS — M85.89 OSTEOPENIA OF MULTIPLE SITES: ICD-10-CM

## 2024-07-26 DIAGNOSIS — I10 ESSENTIAL HYPERTENSION: ICD-10-CM

## 2024-07-26 ASSESSMENT — PATIENT HEALTH QUESTIONNAIRE - PHQ9
2. FEELING DOWN, DEPRESSED, IRRITABLE, OR HOPELESS: NOT AT ALL
1. LITTLE INTEREST OR PLEASURE IN DOING THINGS: NOT AT ALL

## 2024-07-26 ASSESSMENT — ENCOUNTER SYMPTOMS: GENERAL WELL-BEING: GOOD

## 2024-07-26 ASSESSMENT — ACTIVITIES OF DAILY LIVING (ADL): BATHING_REQUIRES_ASSISTANCE: 0

## 2024-08-02 ENCOUNTER — HOSPITAL ENCOUNTER (OUTPATIENT)
Dept: RADIOLOGY | Facility: MEDICAL CENTER | Age: 79
End: 2024-08-02
Attending: STUDENT IN AN ORGANIZED HEALTH CARE EDUCATION/TRAINING PROGRAM
Payer: MEDICARE

## 2024-08-02 DIAGNOSIS — M25.561 CHRONIC PAIN OF BOTH KNEES: ICD-10-CM

## 2024-08-02 DIAGNOSIS — G89.29 CHRONIC PAIN OF BOTH KNEES: ICD-10-CM

## 2024-08-02 DIAGNOSIS — M25.562 CHRONIC PAIN OF BOTH KNEES: ICD-10-CM

## 2024-08-02 PROCEDURE — 73562 X-RAY EXAM OF KNEE 3: CPT | Mod: RT

## 2024-08-05 ENCOUNTER — OFFICE VISIT (OUTPATIENT)
Dept: MEDICAL GROUP | Age: 79
End: 2024-08-05
Payer: MEDICARE

## 2024-08-05 VITALS
WEIGHT: 124 LBS | OXYGEN SATURATION: 96 % | TEMPERATURE: 98.1 F | HEIGHT: 65 IN | DIASTOLIC BLOOD PRESSURE: 55 MMHG | HEART RATE: 60 BPM | SYSTOLIC BLOOD PRESSURE: 108 MMHG | BODY MASS INDEX: 20.66 KG/M2

## 2024-08-05 DIAGNOSIS — R73.03 PREDIABETES: ICD-10-CM

## 2024-08-05 DIAGNOSIS — M17.0 PRIMARY OSTEOARTHRITIS OF BOTH KNEES: ICD-10-CM

## 2024-08-05 DIAGNOSIS — I10 ESSENTIAL HYPERTENSION: ICD-10-CM

## 2024-08-05 PROCEDURE — 99214 OFFICE O/P EST MOD 30 MIN: CPT | Performed by: STUDENT IN AN ORGANIZED HEALTH CARE EDUCATION/TRAINING PROGRAM

## 2024-08-05 PROCEDURE — 3074F SYST BP LT 130 MM HG: CPT | Performed by: STUDENT IN AN ORGANIZED HEALTH CARE EDUCATION/TRAINING PROGRAM

## 2024-08-05 PROCEDURE — 3078F DIAST BP <80 MM HG: CPT | Performed by: STUDENT IN AN ORGANIZED HEALTH CARE EDUCATION/TRAINING PROGRAM

## 2024-08-05 PROCEDURE — G2211 COMPLEX E/M VISIT ADD ON: HCPCS | Performed by: STUDENT IN AN ORGANIZED HEALTH CARE EDUCATION/TRAINING PROGRAM

## 2024-08-05 RX ORDER — LOSARTAN POTASSIUM 50 MG/1
50 TABLET ORAL DAILY
Qty: 90 TABLET | Refills: 2 | Status: SHIPPED | OUTPATIENT
Start: 2024-08-05 | End: 2024-08-05

## 2024-08-05 RX ORDER — LOSARTAN POTASSIUM 50 MG/1
50 TABLET ORAL DAILY
Qty: 90 TABLET | Refills: 2 | Status: SHIPPED | OUTPATIENT
Start: 2024-08-05

## 2024-08-05 RX ORDER — HYDROCHLOROTHIAZIDE 12.5 MG/1
12.5 TABLET ORAL DAILY
Qty: 30 TABLET | Refills: 0 | Status: SHIPPED | OUTPATIENT
Start: 2024-08-05 | End: 2024-08-05

## 2024-08-05 RX ORDER — HYDROCHLOROTHIAZIDE 12.5 MG/1
12.5 TABLET ORAL DAILY
Qty: 90 TABLET | Refills: 2 | Status: SHIPPED | OUTPATIENT
Start: 2024-08-05

## 2024-08-05 ASSESSMENT — ENCOUNTER SYMPTOMS
DOUBLE VISION: 0
BRUISES/BLEEDS EASILY: 0
COUGH: 0
FEVER: 0
HEARTBURN: 0
ABDOMINAL PAIN: 0
PALPITATIONS: 0
BLURRED VISION: 0
HEADACHES: 0
SHORTNESS OF BREATH: 0
ORTHOPNEA: 0
DIZZINESS: 0
DEPRESSION: 0
WEAKNESS: 0
CHILLS: 0
BACK PAIN: 0
BLOOD IN STOOL: 0

## 2024-08-05 ASSESSMENT — FIBROSIS 4 INDEX: FIB4 SCORE: 1.44

## 2024-08-05 NOTE — PROGRESS NOTES
"Subjective:     CC: Imaging follow-up    HPI:   Kathy presents today for scheduled follow-up for  imaging review.  Patient medical history significant for osteoarthritis, she has been complaining of moderate to severe right knee pain and mild left knee pain which exacerbated over the past several weeks.  Left knee x-ray showed mild tricompartmental degenerative changes and chondrocalcinosis.  Right knee imaging showed severe tricompartmental osteoarthritis.  Patient unable to take over-the-counter therapy given history of stomach cancer status post partial gastrectomy.  Patient would like to undergo therapy with Knee injections.    ROS:  Review of Systems   Constitutional:  Negative for chills, fever and malaise/fatigue.   HENT:  Negative for nosebleeds and tinnitus.    Eyes:  Negative for blurred vision and double vision.   Respiratory:  Negative for cough and shortness of breath.    Cardiovascular:  Negative for chest pain, palpitations, orthopnea and leg swelling.   Gastrointestinal:  Negative for abdominal pain, blood in stool, heartburn and melena.   Genitourinary:  Negative for dysuria and urgency.   Musculoskeletal:  Positive for joint pain. Negative for back pain.   Neurological:  Negative for dizziness, weakness and headaches.   Endo/Heme/Allergies:  Does not bruise/bleed easily.   Psychiatric/Behavioral:  Negative for depression and suicidal ideas.        Objective:     Exam:  /55 (BP Location: Left arm, Patient Position: Sitting, BP Cuff Size: Adult)   Pulse 60   Temp 36.7 °C (98.1 °F) (Temporal)   Ht 1.651 m (5' 5\")   Wt 56.2 kg (124 lb)   SpO2 96%   BMI 20.63 kg/m²  Body mass index is 20.63 kg/m².    Physical Exam  Vitals reviewed.   Constitutional:       General: She is not in acute distress.  HENT:      Head: Normocephalic and atraumatic.      Mouth/Throat:      Mouth: Mucous membranes are moist.   Eyes:      General: No scleral icterus.     Extraocular Movements: Extraocular movements " intact.      Pupils: Pupils are equal, round, and reactive to light.   Cardiovascular:      Rate and Rhythm: Normal rate and regular rhythm.      Pulses: Normal pulses.      Heart sounds: Normal heart sounds. No murmur heard.  Pulmonary:      Effort: Pulmonary effort is normal. No respiratory distress.      Breath sounds: Normal breath sounds. No wheezing.   Abdominal:      General: There is no distension.      Palpations: Abdomen is soft.      Tenderness: There is no abdominal tenderness. There is no guarding or rebound.   Musculoskeletal:      Cervical back: Normal range of motion and neck supple.      Right lower leg: No edema.      Left lower leg: No edema.      Comments: Mildly reduced range of motion of the right knee, and pain with forced flexion.   Skin:     General: Skin is warm.      Capillary Refill: Capillary refill takes less than 2 seconds.      Coloration: Skin is not jaundiced.   Neurological:      General: No focal deficit present.      Mental Status: She is alert.   Psychiatric:         Mood and Affect: Mood normal.         Behavior: Behavior normal.       Labs: None    Assessment & Plan:     79 y.o. female with the following -     1. Essential hypertension  -Chronic, stable  -Current therapy with losartan 50 mg daily and hydrochlorothiazide 12.5 mg daily  -Good blood pressure control  -The patient today was 108/55  -Patient stated feeling well, denies any laziness or lightheadedness  -Continue same therapy  -Make sure to monitor your blood pressure at home at least 3 times per week    - hydroCHLOROthiazide 12.5 MG tablet; Take 1 Tablet by mouth every day.  Dispense: 90 Tablet; Refill: 2  - losartan (COZAAR) 50 MG Tab; Take 1 Tablet by mouth every day.  Dispense: 90 Tablet; Refill: 2    2. Primary osteoarthritis of both knees  -Chronic, worsening  -Patient currently using Voltaren cream for pain, unable to tolerate NSAIDs given history of  -Partial gastrectomy her pain has worsened over the past  several weeks,   -right knee pain is much worse than the left  -Imaging showed mild tricompartiment degenerative changes and chondrocalcinosis in the left knee.  Right knee-imaging showed severe tricompartmental osteoarthritis.  -I will schedule patient for knee injection next Thursday    3. Prediabetes  -Chronic, stable  -Recent A1c 6.2%  -Will continue monitoring in the future    Return in about 2 weeks (around 8/19/2024) for Knee Injection.    Secondary to the complexity of this patient's illnesses and their interactions.  All problems listed were discussed during the office visit, medications were evaluated and complexities were discussed as well as plan for the future.      Please note that this dictation was created using voice recognition software. I have made every reasonable attempt to correct obvious errors, but I expect that there are errors of grammar and possibly content that I did not discover before finalizing the note.

## 2024-08-06 ASSESSMENT — ACTIVITIES OF DAILY LIVING (ADL): BATHING_REQUIRES_ASSISTANCE: 0

## 2024-08-06 ASSESSMENT — ENCOUNTER SYMPTOMS: GENERAL WELL-BEING: GOOD

## 2024-08-06 NOTE — ASSESSMENT & PLAN NOTE
This is a new diagnosis for her, likely to be chronic. BP in office today is 124/70. She has been checking it at home and states it has been good. She currently takes losartan 50 mg daily. Was taking HCTZ but states only for a month. Follow up with PCP for continued monitoring and management.

## 2024-08-06 NOTE — ASSESSMENT & PLAN NOTE
Chronic, stable. We discussed her dietary/lifestyle regimen. Follow up with PCP for continued monitoring.        Lab Results   Component Value Date/Time     HBA1C 6.2 (H) 07/12/2024 0956     AVGLUC 131 07/12/2024 0956

## 2024-08-06 NOTE — ASSESSMENT & PLAN NOTE
Chronic, stable. Last DEXA 2024 revealed lumbar spine T score of -1.6 and proximal left femur T score of -2.1. She does take calcium and vitamin D supplementation. Does not engage in much weightbearing exercise due to pain. No hx of fragility fractures. Continue to follow up with PCP as previously scheduled.

## 2024-08-06 NOTE — ASSESSMENT & PLAN NOTE
Chronic, stable. Last lipid panel in July 2024 results below. It was decided she would not initiate statin therapy given her age. We discussed her dietary/lifestyle regimen. Follow up with PCP for continued monitoring and management.        Lab Results   Component Value Date/Time     CHOLSTRLTOT 226 (H) 07/12/2024 09:56 AM     TRIGLYCERIDE 108 07/12/2024 09:56 AM     HDL 82 07/12/2024 09:56 AM      (H) 07/12/2024 09:56 AM

## 2024-08-07 ENCOUNTER — APPOINTMENT (OUTPATIENT)
Dept: FAMILY PLANNING/WOMEN'S HEALTH CLINIC | Facility: PHYSICIAN GROUP | Age: 79
End: 2024-08-07
Payer: MEDICARE

## 2024-08-07 VITALS
HEIGHT: 63 IN | SYSTOLIC BLOOD PRESSURE: 124 MMHG | DIASTOLIC BLOOD PRESSURE: 70 MMHG | BODY MASS INDEX: 22.32 KG/M2 | WEIGHT: 126 LBS

## 2024-08-07 DIAGNOSIS — R73.03 PREDIABETES: ICD-10-CM

## 2024-08-07 DIAGNOSIS — E78.2 MIXED HYPERLIPIDEMIA: ICD-10-CM

## 2024-08-07 DIAGNOSIS — M85.89 OSTEOPENIA OF MULTIPLE SITES: ICD-10-CM

## 2024-08-07 DIAGNOSIS — I10 ESSENTIAL HYPERTENSION: ICD-10-CM

## 2024-08-07 DIAGNOSIS — M17.0 PRIMARY OSTEOARTHRITIS OF BOTH KNEES: ICD-10-CM

## 2024-08-07 PROCEDURE — G0439 PPPS, SUBSEQ VISIT: HCPCS

## 2024-08-07 PROCEDURE — 3078F DIAST BP <80 MM HG: CPT

## 2024-08-07 PROCEDURE — 1126F AMNT PAIN NOTED NONE PRSNT: CPT

## 2024-08-07 PROCEDURE — 3074F SYST BP LT 130 MM HG: CPT

## 2024-08-07 SDOH — ECONOMIC STABILITY: FOOD INSECURITY: WITHIN THE PAST 12 MONTHS, THE FOOD YOU BOUGHT JUST DIDN'T LAST AND YOU DIDN'T HAVE MONEY TO GET MORE.: NEVER TRUE

## 2024-08-07 SDOH — ECONOMIC STABILITY: HOUSING INSECURITY: IN THE LAST 12 MONTHS, HOW MANY PLACES HAVE YOU LIVED?: 1

## 2024-08-07 SDOH — ECONOMIC STABILITY: INCOME INSECURITY: HOW HARD IS IT FOR YOU TO PAY FOR THE VERY BASICS LIKE FOOD, HOUSING, MEDICAL CARE, AND HEATING?: NOT HARD AT ALL

## 2024-08-07 SDOH — ECONOMIC STABILITY: TRANSPORTATION INSECURITY
IN THE PAST 12 MONTHS, HAS THE LACK OF TRANSPORTATION KEPT YOU FROM MEDICAL APPOINTMENTS OR FROM GETTING MEDICATIONS?: NO

## 2024-08-07 SDOH — ECONOMIC STABILITY: FOOD INSECURITY: WITHIN THE PAST 12 MONTHS, YOU WORRIED THAT YOUR FOOD WOULD RUN OUT BEFORE YOU GOT MONEY TO BUY MORE.: NEVER TRUE

## 2024-08-07 SDOH — ECONOMIC STABILITY: INCOME INSECURITY: IN THE LAST 12 MONTHS, WAS THERE A TIME WHEN YOU WERE NOT ABLE TO PAY THE MORTGAGE OR RENT ON TIME?: NO

## 2024-08-07 SDOH — ECONOMIC STABILITY: HOUSING INSECURITY
IN THE LAST 12 MONTHS, WAS THERE A TIME WHEN YOU DID NOT HAVE A STEADY PLACE TO SLEEP OR SLEPT IN A SHELTER (INCLUDING NOW)?: NO

## 2024-08-07 SDOH — ECONOMIC STABILITY: TRANSPORTATION INSECURITY
IN THE PAST 12 MONTHS, HAS LACK OF TRANSPORTATION KEPT YOU FROM MEETINGS, WORK, OR FROM GETTING THINGS NEEDED FOR DAILY LIVING?: NO

## 2024-08-07 ASSESSMENT — PAIN SCALES - GENERAL: PAINLEVEL: NO PAIN

## 2024-08-07 ASSESSMENT — FIBROSIS 4 INDEX: FIB4 SCORE: 1.44

## 2024-08-07 ASSESSMENT — PATIENT HEALTH QUESTIONNAIRE - PHQ9: CLINICAL INTERPRETATION OF PHQ2 SCORE: 0

## 2024-08-07 NOTE — ASSESSMENT & PLAN NOTE
Chronic, stable. She is very limited in her activity due to pain in both knees. She cannot exercise due to pain. Has joint injection scheduled for next week. Uses Voltaren gel and tylenol for pain. She is not interested in joint replacement surgery. Follow up with PCP at least annually for continued monitoring and management.

## 2024-08-07 NOTE — PROGRESS NOTES
Comprehensive Health Assessment Program     Kathy Carvajal is a 79 y.o. here for her comprehensive health assessment.    A qualified  was used to interpret [place] during this encounter.  ’s name/ID number was : [id number]   and mode of interpretation was iPad  .        Patient Active Problem List    Diagnosis Date Noted    History of benign schwannoma 2024    Microcytic anemia 2024    Vitamin D insufficiency 2024    Prediabetes 2024    Osteopenia of multiple sites 2024    Chilel neuroma, right 2024    Mixed hyperlipidemia 2016    Essential hypertension 2016    Elevated fasting glucose 2016    Primary osteoarthritis of right knee 2016    Elevated BP 2013    Osteoarthritis of both knees     Heart burn     History of stomach cancer     Visit for screening mammogram        Current Outpatient Medications   Medication Sig Dispense Refill    Acetaminophen (TYLENOL) 325 MG Cap Take  by mouth.      calcium-vitamin D 250-3.125 MG-MCG Tab tablet Take 1 Tablet by mouth every day.      hydroCHLOROthiazide 12.5 MG tablet Take 1 Tablet by mouth every day. 90 Tablet 2    losartan (COZAAR) 50 MG Tab Take 1 Tablet by mouth every day. 90 Tablet 2    diclofenac sodium (VOLTAREN) 1 % Gel Apply 2 g topically 4 times a day as needed (foot pain). 50 g 0     No current facility-administered medications for this visit.          Current supplements as per medication list.     Allergies:   Nkda [no known drug allergy]  Social History     Tobacco Use    Smoking status: Former     Current packs/day: 0.00     Average packs/day: 1 pack/day for 2.0 years (2.0 ttl pk-yrs)     Types: Cigarettes     Start date: 1991     Quit date: 1993     Years since quittin.2    Smokeless tobacco: Never   Vaping Use    Vaping status: Never Used   Substance Use Topics    Alcohol use: No    Drug use: No     Family History   Problem Relation  Age of Onset    Cancer Other         testicular cancer at 41    Lung Disease Mother         pneumonia    Cancer Father         stomach at age 48    Cancer Brother         at age 46, apparently brain     Kathy  has a past medical history of Arthritis, Cancer (HCC) (01/01/2008), Dizziness, Heart burn, Hyperlipidemia, 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   Past Surgical History:   Procedure Laterality Date    GASTROSCOPY WITH APC  6/6/08    Performed by EVELINE SUAREZ at SUR[facility]ERY [facility]    EGD W/ENDOSCOPIC ULTRASOUND  6/6/08    Performed by EVELINE SUAREZ at [facility]    EYE SURGERY  2008    OTHER  7/07    tumors removed both eyes    CHOLECYSTECTOMY  6/07    OTHER      cataract surgery.     PRIMARY C SECTION         Screening:  In the last six months have you experienced any leakage of urine? NO    Depression Screening  Little interest or pleasure in doing things?  0 - not at all  Feeling down, depressed , or hopeless? 0 - not at all  Patient Health Questionnaire Score: 0     If depressive symptoms identified deferred to follow up visit unless specifically addressed in assessment and plan.    Interpretation of PHQ-9 Total Score   Score Severity   1-4 No Depression   5-9 Mild Depression   10-14 Moderate Depression   15-19 Moderately Severe Depression   20-27 Severe Depression    Screening for Cognitive Impairment  Do you or any of your friends or family members have any concern about your memory? No  Three Minute Recall (Leader, Season, Table) 3/3    Eric clock face with all 12 numbers and set the hands to show 10 minutes after 11.  No Pt placed minute numbers not hour numbers  Cognitive concerns identified deferred for follow up unless specifically addressed in assessment and plan.    Fall Risk Assessment  Has the patient had two or more falls in the last year or any fall with injury  in the last year?  No    Safety Assessment  Do you always wear your seatbelt?  Yes  Any changes to home needed to function safely? No  Difficulty hearing.  Yes, deaf out of one ear. Has hearing aids but has tinnitus so they do not really work well for her.  Patient counseled about all safety risks that were identified.    Functional Assessment ADLs  Are there any barriers preventing you from cooking for yourself or meeting nutritional needs?  No.    Are there any barriers preventing you from driving safely or obtaining transportation?  No.    Are there any barriers preventing you from using a telephone or calling for help?  No    Are there any barriers preventing you from shopping?  No.    Are there any barriers preventing you from taking care of your own finances?  No    Are there any barriers preventing you from managing your medications?  No    Are there any barriers preventing you from showering, bathing or dressing yourself? No    Are there any barriers preventing you from doing housework or laundry? No  Are there any barriers preventing you from using the toilet?No  Are you currently engaging in any exercise or physical activity?  Yes.  She gets dizzy.     Self-Assessment of Health  What is your perception of your health? Good    Do you sleep more than six hours a night? Yes    In the past 7 days, how much did pain keep you from doing your normal work? A lot    Do you spend quality time with family or friends (virtually or in person)? Yes    Do you usually eat a heart healthy diet that constists of a variety of fruits, vegetables, whole grains and fiber? Yes    Do you eat foods high in fat and/or Fast Food more than three times per week? No    How concerned are you that your medical conditions are not being well managed? very    Are you worried that in the next 2 months, you may not have stable housing that you own, rent, or stay in as part of a household? No      Advance Care Planning  Do you have an Advance  Directive, Living Will, Durable Power of , or POLST? No                 Health Maintenance Summary            Overdue - Zoster (Shingles) Vaccines (1 of 2) Never done      No completion history exists for this topic.              Overdue - COVID-19 Vaccine (1 - 2023-24 season) Never done      No completion history exists for this topic.              Postponed - Pneumococcal Vaccine: 65+ Years (1 of 1 - PCV) Postponed until 6/13/2025      No completion history exists for this topic.              Influenza Vaccine (1) Next due on 9/1/2024      No completion history exists for this topic.              Annual Wellness Visit (Yearly) Next due on 8/7/2025 08/07/2024  Level of Service: ID ANNUAL WELLNESS VISIT-INCLUDES PPPS SUBSEQUE*              Bone Density Scan (Every 5 Years) Next due on 7/12/2029 07/12/2024  DS-BONE DENSITY STUDY (DEXA)    09/06/2012  DS-BONE DENSITY STUDY (DEXA)              IMM DTaP/Tdap/Td Vaccine (2 - Tdap) Next due on 1/1/2032 01/01/2022  Imm Admin: Dtap Vaccine              Hepatitis A Vaccine (Hep A) (Series Information) Aged Out      No completion history exists for this topic.              Hepatitis B Vaccine (Hep B) (Series Information) Aged Out      No completion history exists for this topic.              HPV Vaccines (Series Information) Aged Out      No completion history exists for this topic.              Polio Vaccine (Inactivated Polio) (Series Information) Aged Out      No completion history exists for this topic.              Meningococcal Immunization (Series Information) Aged Out      No completion history exists for this topic.              Discontinued - Colorectal Cancer Screening  Discontinued        Frequency changed to Never automatically (Topic No Longer Applies)    08/16/2015  OCCULT BLOOD FECES IMMUNOASSAY              Discontinued - Hepatitis C Screening  Discontinued        Frequency changed to Never automatically (Topic No Longer Applies)     "07/12/2024  Hepatitis C Antibody component of HEP C VIRUS ANTIBODY                    Patient Care Team:  Pranay Lewis M.D. as PCP - General (Internal Medicine)      Financial Resource Strain: Low Risk  (8/7/2024)    Overall Financial Resource Strain (CARDIA)     Difficulty of Paying Living Expenses: Not hard at all      Transportation Needs: No Transportation Needs (8/7/2024)    PRAPARE - Transportation     Lack of Transportation (Medical): No     Lack of Transportation (Non-Medical): No      Food Insecurity: No Food Insecurity (8/7/2024)    Hunger Vital Sign     Worried About Running Out of Food in the Last Year: Never true     Ran Out of Food in the Last Year: Never true        Encounter Vitals  Blood Pressure : 124/70  Weight: 57.2 kg (126 lb)  Height: 160 cm (5' 3\")  BMI (Calculated): 22.32  Pain Score: No pain     Alert, oriented in no acute distress.  Eye contact is good, speech goal directed, affect calm.    Assessment and Plan. The following treatment and monitoring plan is recommended:  Essential hypertension  This is a new diagnosis for her, likely to be chronic. BP in office today is 124/70. She has been checking it at home and states it has been good. She currently takes losartan 50 mg daily. Was taking HCTZ but states only for a month. Follow up with PCP for continued monitoring and management.     Mixed hyperlipidemia  Chronic, stable. Last lipid panel in July 2024 results below. It was decided she would not initiate statin therapy given her age. We discussed her dietary/lifestyle regimen. Follow up with PCP for continued monitoring and management.        Lab Results   Component Value Date/Time     CHOLSTRLTOT 226 (H) 07/12/2024 09:56 AM     TRIGLYCERIDE 108 07/12/2024 09:56 AM     HDL 82 07/12/2024 09:56 AM      (H) 07/12/2024 09:56 AM        Osteopenia of multiple sites  Chronic, stable. Last DEXA 2024 revealed lumbar spine T score of -1.6 and proximal left femur T score of -2.1. She " does take calcium and vitamin D supplementation. Does not engage in much weightbearing exercise due to pain. No hx of fragility fractures. Continue to follow up with PCP as previously scheduled.     Prediabetes  Chronic, stable. We discussed her dietary/lifestyle regimen. Follow up with PCP for continued monitoring.        Lab Results   Component Value Date/Time     HBA1C 6.2 (H) 07/12/2024 0956     AVGLUC 131 07/12/2024 0956       Osteoarthritis of both knees  Chronic, stable. She is very limited in her activity due to pain in both knees. She cannot exercise due to pain. Has joint injection scheduled for next week. Uses Voltaren gel and tylenol for pain. She is not interested in joint replacement surgery. Follow up with PCP at least annually for continued monitoring and management.        Services suggested: No services needed at this time  Health Care Screening: Age-appropriate preventive services recommended by USPTF and ACIP covered by Medicare were discussed today. Services ordered if indicated and agreed upon by the patient.  Referrals offered: Community-based lifestyle interventions to reduce health risks and promote self-management and wellness, fall prevention, nutrition, physical activity, tobacco-use cessation, weight loss, and mental health services as per orders if indicated.    Discussion today about general wellness and lifestyle habits:    Prevent falls and reduce trip hazards; Cautioned about securing or removing rugs.  Have a working fire alarm and carbon monoxide detector.  Engage in regular physical activity and social activities.    Follow-up: No follow-ups on file.

## 2025-01-10 ENCOUNTER — PATIENT MESSAGE (OUTPATIENT)
Dept: HEALTH INFORMATION MANAGEMENT | Facility: OTHER | Age: 80
End: 2025-01-10

## 2025-01-16 ENCOUNTER — OFFICE VISIT (OUTPATIENT)
Dept: URGENT CARE | Facility: CLINIC | Age: 80
End: 2025-01-16
Payer: MEDICARE

## 2025-01-16 VITALS
OXYGEN SATURATION: 98 % | WEIGHT: 124 LBS | HEART RATE: 58 BPM | HEIGHT: 63 IN | TEMPERATURE: 97.9 F | RESPIRATION RATE: 16 BRPM | DIASTOLIC BLOOD PRESSURE: 80 MMHG | BODY MASS INDEX: 21.97 KG/M2 | SYSTOLIC BLOOD PRESSURE: 148 MMHG

## 2025-01-16 DIAGNOSIS — H10.32 ACUTE BACTERIAL CONJUNCTIVITIS OF LEFT EYE: ICD-10-CM

## 2025-01-16 DIAGNOSIS — J01.00 ACUTE NON-RECURRENT MAXILLARY SINUSITIS: ICD-10-CM

## 2025-01-16 DIAGNOSIS — R03.0 ELEVATED BLOOD PRESSURE READING: ICD-10-CM

## 2025-01-16 PROCEDURE — 3077F SYST BP >= 140 MM HG: CPT | Performed by: NURSE PRACTITIONER

## 2025-01-16 PROCEDURE — 99214 OFFICE O/P EST MOD 30 MIN: CPT | Performed by: NURSE PRACTITIONER

## 2025-01-16 PROCEDURE — 3079F DIAST BP 80-89 MM HG: CPT | Performed by: NURSE PRACTITIONER

## 2025-01-16 RX ORDER — POLYMYXIN B SULFATE AND TRIMETHOPRIM 1; 10000 MG/ML; [USP'U]/ML
1 SOLUTION OPHTHALMIC 4 TIMES DAILY
Qty: 10 ML | Refills: 0 | Status: SHIPPED | OUTPATIENT
Start: 2025-01-16 | End: 2025-01-23

## 2025-01-16 ASSESSMENT — FIBROSIS 4 INDEX: FIB4 SCORE: 1.44

## 2025-01-16 NOTE — PROGRESS NOTES
Chief Complaint   Patient presents with    Otalgia     X1WEEK left ear throbbing pain/hot sensation/left eye watery       HISTORY OF PRESENT ILLNESS: Patient is a pleasant 79 y.o. female who presents today due to over one week of nasal congestion, malaise, fatigue, headache, left ear pain, and sinus pressure.  She also endorses left eye erythema and watering.  She denies associated cough, difficulty breathing, confusion, nausea, vomiting or diarrhea.  She has tried OTC cold/sinus medication at home without much improvement.  She is Slovak-speaking,  is used for the entire visit.      Patient Active Problem List    Diagnosis Date Noted    History of benign schwannoma 07/25/2024    Microcytic anemia 07/22/2024    Vitamin D insufficiency 07/22/2024    Prediabetes 07/22/2024    Osteopenia of multiple sites 07/22/2024    Chilel neuroma, right 07/22/2024    Mixed hyperlipidemia 08/05/2016    Essential hypertension 08/05/2016    Elevated fasting glucose 08/05/2016    Primary osteoarthritis of right knee 08/05/2016    Elevated BP 06/03/2013    Osteoarthritis of both knees     Heart burn     History of stomach cancer     Visit for screening mammogram        Allergies:Nkda [no known drug allergy]    Current Outpatient Medications Ordered in Epic   Medication Sig Dispense Refill    amoxicillin-clavulanate (AUGMENTIN) 875-125 MG Tab Take 1 Tablet by mouth 2 times a day for 7 days. 14 Tablet 0    polymixin-trimethoprim (POLYTRIM) 10800-7.1 UNIT/ML-% Solution Administer 1 Drop into the left eye 4 times a day for 7 days. 10 mL 0    colchicine (COLCRYS) 0.6 MG Tab Take 2 tablets by mouth  then 8 hours later take 1 tablet by mouth 3 Tablet 0    calcium-vitamin D 250-3.125 MG-MCG Tab tablet Take 1 Tablet by mouth every day.      diclofenac sodium (VOLTAREN) 1 % Gel Apply 2 g topically 4 times a day as needed (foot pain). 50 g 0    hydroCHLOROthiazide 12.5 MG tablet Take 1 Tablet by mouth every day. (Patient not  taking: Reported on 2025) 90 Tablet 2    losartan (COZAAR) 50 MG Tab Take 1 Tablet by mouth every day. (Patient not taking: Reported on 2025) 90 Tablet 2     No current Epic-ordered facility-administered medications on file.       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[place]    left ear, follow up by MRI every year in [facility], last one 2009    Schwannoma     ear- gamma knife, left per patient, unable to hear on left    Schwannoma of cranial nerve (HCC)     S/p resection     Screening mammogram 2006    Pt refuses to have more mammograms or pap smears    Thyroid nodule     Tinnitus        Social History     Tobacco Use    Smoking status: Former     Current packs/day: 0.00     Average packs/day: 1 pack/day for 2.0 years (2.0 ttl pk-yrs)     Types: Cigarettes     Start date: 1991     Quit date: 1993     Years since quittin.7    Smokeless tobacco: Never   Vaping Use    Vaping status: Never Used   Substance Use Topics    Alcohol use: No    Drug use: No       Family Status   Relation Name Status    OTHER son Alive    Mo      Fa      Bro     No partnership data on file     Family History   Problem Relation Age of Onset    Cancer Other         testicular cancer at 41    Lung Disease Mother         pneumonia    Cancer Father         stomach at age 48    Cancer Brother         at age 46, apparently brain       ROS:  Review of Systems   Constitutional: Positive for malaise, fatigue. Negative for weight loss.   HENT: Positive for left-sided ear pain, sinus pressure, sore throat, nasal congestion. Negative for ear pain, nosebleeds, neck pain.    Eyes: Negative for vision changes.   Neuro: Positive for headache. Negative for sensory changes, weakness, seizure, LOC.  Cardiovascular: Negative for chest pain, palpitations, orthopnea and  "leg swelling.   Respiratory: Negative for cough, sputum production, shortness of breath and wheezing.   Gastrointestinal: Negative for abdominal pain, nausea, vomiting or diarrhea.    Skin: Negative for rash, diaphoresis.     Exam:  BP (!) 148/80   Pulse (!) 58   Temp 36.6 °C (97.9 °F) (Temporal)   Resp 16   Ht 1.61 m (5' 3.39\")   Wt 56.2 kg (124 lb)   SpO2 98%   General: well-nourished, well-developed female in NAD  Head: normocephalic, atraumatic  Eyes: PERRLA, left-sided conjunctival injection with tearing, lids normal.  Ears: normal shape and symmetry, no tenderness, no discharge. External canals are without any significant edema or erythema. Tympanic membranes are without any inflammation, no effusion. Gross auditory acuity is intact.  Nose: symmetrical without tenderness, erythema and swelling noted bilateral turbinates, clear discharge.  Left maxillary sinus tenderness.   Mouth/Throat: reasonable hygiene, no exudates or tonsillar enlargement. Erythema is present.   Neck: no masses, range of motion within normal limits, no tracheal deviation. No obvious thyroid enlargement.   Lymph: no cervical adenopathy. No supraclavicular adenopathy.   Neuro: alert and oriented. Cranial nerves 1-12 grossly intact. No sensory deficit.   Cardiovascular: regular rate and rhythm. No edema.  Pulmonary: no distress. Chest is symmetrical with respiration, no wheezes, crackles, or rhonchi.   Musculoskeletal: no clubbing, appropriate muscle tone, gait is stable.  Skin: warm, dry, intact, no clubbing, no cyanosis, no rashes.   Psych: appropriate mood, affect, judgement.         Assessment/Plan:  1. Acute non-recurrent maxillary sinusitis  amoxicillin-clavulanate (AUGMENTIN) 875-125 MG Tab      2. Acute bacterial conjunctivitis of left eye  polymixin-trimethoprim (POLYTRIM) 24867-4.1 UNIT/ML-% Solution      3. Elevated blood pressure reading              Augmentin and Polytrim as directed. Sleep with HOB elevated, humidifier at " night, rest, increase fluid intake. As a side note, patient's blood pressure found to be elevated today, instructed to monitor and follow-up with PCP regarding.  Supportive care, differential diagnoses, and indications for immediate follow-up discussed with patient.   Pathogenesis of diagnosis discussed including typical length and natural progression.   Instructed to return to clinic or nearest emergency department for any change in condition, further concerns, or worsening of symptoms.  Patient states understanding of the plan of care and discharge instructions.  Instructed to make an appointment, for follow up, with her primary care provider.        Please note that this dictation was created using voice recognition software. I have made every reasonable attempt to correct obvious errors, but I expect that there are errors of grammar and possibly content that I did not discover before finalizing the note.       ERAN Orlando.

## 2025-02-05 ENCOUNTER — OFFICE VISIT (OUTPATIENT)
Dept: URGENT CARE | Facility: CLINIC | Age: 80
End: 2025-02-05
Payer: MEDICARE

## 2025-02-05 VITALS
HEART RATE: 58 BPM | SYSTOLIC BLOOD PRESSURE: 132 MMHG | HEIGHT: 63 IN | WEIGHT: 124 LBS | DIASTOLIC BLOOD PRESSURE: 72 MMHG | RESPIRATION RATE: 16 BRPM | BODY MASS INDEX: 21.97 KG/M2 | OXYGEN SATURATION: 98 % | TEMPERATURE: 97.3 F

## 2025-02-05 DIAGNOSIS — Z86.018 HISTORY OF ACOUSTIC NEUROMA: ICD-10-CM

## 2025-02-05 DIAGNOSIS — H04.203 WATERY EYES: ICD-10-CM

## 2025-02-05 PROCEDURE — 3078F DIAST BP <80 MM HG: CPT | Performed by: FAMILY MEDICINE

## 2025-02-05 PROCEDURE — 99213 OFFICE O/P EST LOW 20 MIN: CPT | Performed by: FAMILY MEDICINE

## 2025-02-05 PROCEDURE — 3075F SYST BP GE 130 - 139MM HG: CPT | Performed by: FAMILY MEDICINE

## 2025-02-05 RX ORDER — POLYMYXIN B SULFATE AND TRIMETHOPRIM 1; 10000 MG/ML; [USP'U]/ML
SOLUTION OPHTHALMIC
COMMUNITY
Start: 2025-01-16

## 2025-02-05 ASSESSMENT — FIBROSIS 4 INDEX: FIB4 SCORE: 1.44

## 2025-02-05 NOTE — PROGRESS NOTES
"  Subjective:      79 y.o. female presents to urgent care for watering eyes that have been present for the last 1 month.  She was seen 1/16/2025 for similar symptoms and given Polytrim which did provide some relief.  Unfortunately, symptoms do persist.  No associated pain or change in vision.  She also notes a history of left-sided acoustic neuroma.  She was followed by specialist in [facility], but has not seen anyone since moving here 6 to 7 years ago.  She notes that her left ear feels \"warm \"intermittently and she is concerned.    She denies any other questions or concerns at this time.    Current problem list, medication, and past medical/surgical history were reviewed in Epic.    ROS  See HPI     Objective:      /72   Pulse (!) 58   Temp 36.3 °C (97.3 °F)   Resp 16   Ht 1.6 m (5' 3\")   Wt 56.2 kg (124 lb)   SpO2 98%   BMI 21.97 kg/m²     Physical Exam  Constitutional:       General: She is not in acute distress.     Appearance: She is not diaphoretic.   HENT:      Right Ear: Tympanic membrane, ear canal and external ear normal.      Left Ear: Tympanic membrane, ear canal and external ear normal.   Eyes:      General:         Right eye: Discharge present.         Left eye: Discharge present.     Extraocular Movements: Extraocular movements intact.      Conjunctiva/sclera: Conjunctivae normal.      Pupils: Pupils are equal, round, and reactive to light.   Cardiovascular:      Rate and Rhythm: Regular rhythm. Bradycardia present.      Heart sounds: Normal heart sounds.   Pulmonary:      Effort: Pulmonary effort is normal. No respiratory distress.      Breath sounds: Normal breath sounds.   Neurological:      Mental Status: She is alert.   Psychiatric:         Mood and Affect: Affect normal.         Judgment: Judgment normal.       Assessment/Plan:     1. Watery eyes  She was encouraged to use [employer] as needed.    2. History of acoustic neuroma  Referral to see ENT for further evaluation has been " placed.  - Referral to ENT      Instructed to return to Urgent Care or nearest Emergency Department if symptoms fail to improve, for any change in condition, further concerns, or new concerning symptoms. Patient states understanding of the plan of care and discharge instructions.    Mary Trejo M.D.

## 2025-04-07 ENCOUNTER — OFFICE VISIT (OUTPATIENT)
Dept: MEDICAL GROUP | Age: 80
End: 2025-04-07
Payer: MEDICARE

## 2025-04-07 VITALS
DIASTOLIC BLOOD PRESSURE: 62 MMHG | HEART RATE: 67 BPM | SYSTOLIC BLOOD PRESSURE: 104 MMHG | OXYGEN SATURATION: 96 % | WEIGHT: 119 LBS | TEMPERATURE: 97.4 F | HEIGHT: 63 IN | BODY MASS INDEX: 21.09 KG/M2

## 2025-04-07 DIAGNOSIS — H90.6 MIXED CONDUCTIVE AND SENSORINEURAL HEARING LOSS OF BOTH EARS: ICD-10-CM

## 2025-04-07 DIAGNOSIS — H93.13 TINNITUS OF BOTH EARS: ICD-10-CM

## 2025-04-07 DIAGNOSIS — M17.0 PRIMARY OSTEOARTHRITIS OF BOTH KNEES: ICD-10-CM

## 2025-04-07 PROCEDURE — 3078F DIAST BP <80 MM HG: CPT | Performed by: STUDENT IN AN ORGANIZED HEALTH CARE EDUCATION/TRAINING PROGRAM

## 2025-04-07 PROCEDURE — 99214 OFFICE O/P EST MOD 30 MIN: CPT | Performed by: STUDENT IN AN ORGANIZED HEALTH CARE EDUCATION/TRAINING PROGRAM

## 2025-04-07 PROCEDURE — 3074F SYST BP LT 130 MM HG: CPT | Performed by: STUDENT IN AN ORGANIZED HEALTH CARE EDUCATION/TRAINING PROGRAM

## 2025-04-07 ASSESSMENT — ENCOUNTER SYMPTOMS
WEAKNESS: 0
COUGH: 0
ORTHOPNEA: 0
SENSORY CHANGE: 0
BRUISES/BLEEDS EASILY: 0
FEVER: 0
CHILLS: 0
DIZZINESS: 0
BLOOD IN STOOL: 0
SHORTNESS OF BREATH: 0
PALPITATIONS: 0
BLURRED VISION: 0
DOUBLE VISION: 0
ABDOMINAL PAIN: 0
WHEEZING: 0
BACK PAIN: 0
DEPRESSION: 0
HEADACHES: 0

## 2025-04-07 ASSESSMENT — FIBROSIS 4 INDEX: FIB4 SCORE: 1.44

## 2025-04-07 ASSESSMENT — LIFESTYLE VARIABLES: SUBSTANCE_ABUSE: 0

## 2025-04-07 ASSESSMENT — PATIENT HEALTH QUESTIONNAIRE - PHQ9: CLINICAL INTERPRETATION OF PHQ2 SCORE: 0

## 2025-04-08 NOTE — PROGRESS NOTES
"Subjective:     CC: Tinnitus    HPI:   History of Present Illness  The patient is an 79-year-old female presenting for a referral to ENT. She is accompanied by a .    She has been experiencing persistent knee pain, which she describes as intermittent in nature. The pain is severe enough to disrupt her sleep, causing her to awaken at night. She has been managing the pain with Tylenol, but it appears to be ineffective in providing relief.    She is also requesting a referral to see an ENT given persistent tinnitus.  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     MEDICATIONS  Tylenol       ROS:  Review of Systems   Constitutional:  Negative for chills, fever and malaise/fatigue.   HENT:  Negative for nosebleeds and tinnitus.    Eyes:  Negative for blurred vision and double vision.   Respiratory:  Negative for cough, shortness of breath and wheezing.    Cardiovascular:  Negative for chest pain, palpitations, orthopnea and leg swelling.   Gastrointestinal:  Negative for abdominal pain, blood in stool and melena.   Genitourinary:  Negative for dysuria, frequency and urgency.   Musculoskeletal:  Negative for back pain and joint pain.   Skin:  Negative for rash.   Neurological:  Negative for dizziness, sensory change, weakness and headaches.   Endo/Heme/Allergies:  Does not bruise/bleed easily.   Psychiatric/Behavioral:  Negative for depression, substance abuse and suicidal ideas.        Objective:     Exam:  /62 (BP Location: Right arm, Patient Position: Sitting, BP Cuff Size: Adult)   Pulse 67   Temp 36.3 °C (97.4 °F) (Temporal)   Ht 1.6 m (5' 3\")   Wt 54 kg (119 lb)   SpO2 96%   BMI 21.08 kg/m²  Body mass index is 21.08 kg/m².    Physical Exam  Constitutional:       General: She is not in acute distress.  HENT:      Head: Normocephalic and atraumatic.      Mouth/Throat:      Mouth: Mucous " membranes are moist.   Eyes:      General: No scleral icterus.     Extraocular Movements: Extraocular movements intact.      Pupils: Pupils are equal, round, and reactive to light.   Cardiovascular:      Rate and Rhythm: Normal rate and regular rhythm.      Pulses: Normal pulses.      Heart sounds: Normal heart sounds. No murmur heard.  Pulmonary:      Effort: Pulmonary effort is normal. No respiratory distress.      Breath sounds: Normal breath sounds. No wheezing.   Abdominal:      General: There is no distension.      Palpations: Abdomen is soft.      Tenderness: There is no abdominal tenderness. There is no guarding or rebound.   Musculoskeletal:      Cervical back: Normal range of motion and neck supple.      Right lower leg: No edema.      Left lower leg: No edema.   Skin:     General: Skin is warm.      Capillary Refill: Capillary refill takes less than 2 seconds.      Coloration: Skin is not jaundiced.      Findings: No bruising.   Neurological:      General: No focal deficit present.      Mental Status: She is alert.      Cranial Nerves: No cranial nerve deficit.      Sensory: No sensory deficit.   Psychiatric:         Mood and Affect: Mood normal.         Behavior: Behavior normal.       Labs: Reviewed    Assessment & Plan:     1. Primary osteoarthritis of both knees  She reports experiencing knee pain that disrupts her sleep. She has been using Tylenol for pain management, but it has not been effective. A referral to an orthopedic specialist will be initiated for further evaluation and management of her knee pain.    2. Tinnitus of both ears  3. Mixed conductive and sensorineural hearing loss of both ears  -Chronic tinnitus, patient requesting ENT referral  -I discussed with the patient that this condition is very difficult to treat and may not be curative therapy.  However, a referral to see an specialist will be placed.  - Referral to ENT        15-Oct-2019 00:37

## 2025-07-30 ENCOUNTER — APPOINTMENT (OUTPATIENT)
Dept: RADIOLOGY | Facility: IMAGING CENTER | Age: 80
End: 2025-07-30
Attending: NURSE PRACTITIONER
Payer: MEDICARE

## 2025-07-30 ENCOUNTER — OFFICE VISIT (OUTPATIENT)
Dept: URGENT CARE | Facility: CLINIC | Age: 80
End: 2025-07-30
Payer: MEDICARE

## 2025-07-30 VITALS
BODY MASS INDEX: 22.38 KG/M2 | TEMPERATURE: 97.9 F | RESPIRATION RATE: 12 BRPM | HEIGHT: 60 IN | WEIGHT: 114 LBS | OXYGEN SATURATION: 99 % | SYSTOLIC BLOOD PRESSURE: 150 MMHG | DIASTOLIC BLOOD PRESSURE: 66 MMHG | HEART RATE: 66 BPM

## 2025-07-30 DIAGNOSIS — M54.50 ACUTE RIGHT-SIDED LOW BACK PAIN WITHOUT SCIATICA: ICD-10-CM

## 2025-07-30 DIAGNOSIS — R03.0 ELEVATED BP WITHOUT DIAGNOSIS OF HYPERTENSION: ICD-10-CM

## 2025-07-30 DIAGNOSIS — M25.551 RIGHT HIP PAIN: Primary | ICD-10-CM

## 2025-07-30 RX ORDER — MELOXICAM 15 MG/1
15 TABLET ORAL DAILY
Qty: 30 TABLET | Refills: 0 | Status: SHIPPED | OUTPATIENT
Start: 2025-07-30

## 2025-07-30 ASSESSMENT — FIBROSIS 4 INDEX: FIB4 SCORE: 1.46

## 2025-07-30 NOTE — PROGRESS NOTES
Verbal consent was acquired by the patient to use [employer] ambient listening note generation during this visit          Chief Complaint   Patient presents with    Hip Pain     X 4 days R hip px, no known injury, unable to see PCP until Oct.          History of Present Illness  The patient is an 80-year-old female who presents for evaluation of right hip pain.    She reports experiencing pain in her right hip, which she attributes to arthritis. The pain is described as a burning sensation that occasionally disrupts her sleep. Additionally, she mentions discomfort in her lower back and the bone area. The pain is constant and has limited her ability to exercise. She has been managing the pain with Advil, taking two tablets twice daily when the pain intensifies. She finds relief when lying down but experiences discomfort when lying on her right side. She reports no recent injuries. Her appetite has decreased due to the pain. She has previously undergone x-rays.         ROS:    No severe shortness of breath   No cardiac like chest pain, as discussed   As otherwise stated in HPI    Medical/SX/ Social History:  Reviewed per chart    Pertinent Medications:    Medications Ordered Prior to Encounter[1]     Allergies:    Nkda [no known drug allergy]     Problem list, medications, and allergies reviewed by myself today in Epic     Physical Exam:    Vitals:    07/30/25 1559   BP: (!) 150/66   Pulse: 66   Resp: 12   Temp: 36.6 °C (97.9 °F)   SpO2: 99%             Physical Exam  Vitals and nursing note reviewed.   Constitutional:       General: She is not in acute distress.     Appearance: Normal appearance. She is not ill-appearing or toxic-appearing.   HENT:      Head: Normocephalic and atraumatic.      Nose: Nose normal.      Mouth/Throat:      Mouth: Mucous membranes are moist.      Pharynx: Oropharynx is clear.   Eyes:      Extraocular Movements: Extraocular movements intact.      Conjunctiva/sclera: Conjunctivae normal.       Pupils: Pupils are equal, round, and reactive to light.   Cardiovascular:      Rate and Rhythm: Normal rate.      Pulses: Normal pulses.   Pulmonary:      Effort: Pulmonary effort is normal.   Musculoskeletal:      Cervical back: Normal and normal range of motion.      Thoracic back: Normal.      Lumbar back: Tenderness present. No swelling, edema, deformity, signs of trauma, lacerations, spasms or bony tenderness. Decreased range of motion. Negative right straight leg raise test and negative left straight leg raise test. No scoliosis.        Back:       Right hip: Tenderness and bony tenderness present. No deformity or lacerations. Decreased range of motion.      Comments: There is pain to palpation over the right low back over the paraspinal musculature.  There is no bony pain to palpation.  No erythema, edema or bruising.  There is decreased ROM due to pain.  Patient reports worsening pain with forward flexion.  Patient has TTP over the GTB on the right.  Gait is antalgic due to pain.     Skin:     General: Skin is warm.      Capillary Refill: Capillary refill takes less than 2 seconds.   Neurological:      General: No focal deficit present.      Mental Status: She is alert and oriented to person, place, and time.            Diagnostics:      RADIOLOGY RESULTS   DX-LUMBAR SPINE-2 OR 3 VIEWS  Result Date: 7/30/2025 7/30/2025 4:33 PM HISTORY/REASON FOR EXAM:  Atraumatic Pain.  RIGHT low back pain. TECHNIQUE/ EXAM DESCRIPTION AND NUMBER OF VIEWS:  3 views of the lumbar spine. COMPARISON: 5/15/2013 FINDINGS: Vertebral alignment is preserved. Vertebral body heights are preserved. Multilevel loss of disc height and osteophyte formation. The facet joints show normal alignment. Facet hypertrophy at L4-5 and L5-S1. Lumbosacral junction is intact. Pedicles appear intact. Postoperative change of upper abdomen.     1.  Moderate multilevel degenerative change of lumbar spine. 2.  No fracture or  subluxation.    DX-HIP-UNILATERAL-W/O PELVIS-2/3 VIEWS RIGHT  Result Date: 7/30/2025 7/30/2025 4:33 PM HISTORY/REASON FOR EXAM:  Atraumatic Pelvic/Hip Pain. TECHNIQUE/EXAM DESCRIPTION AND NUMBER OF VIEWS:  2 views of the RIGHT hip. COMPARISON: 5/15/2013 FINDINGS: Visualized RIGHT pelvis is intact. Visualized proximal femur is intact and normally located. Loss of joint space in the RIGHT hip with osteophyte formation. No gross soft tissue abnormality.     1.  No RIGHT hip fracture or dislocation. 2.  Moderate degenerative change of RIGHT hip.             Medical Decision making and plan :  I personally reviewed prior external notes and test results pertinent to today's visit. Pt is clinically stable at today's acute urgent care visit.  Patient appears nontoxic with no acute distress noted. Appropriate for outpatient care at this time.    Pleasant 80 y.o. female presented clinic with:     Assessment & Plan  1. Right hip pain.  2. Acute low back pain.   - Persistent right hip pain attributed to arthritis vs greater trochanteric bursitis as she is tender directly over the GTB joint. Differential also includes radiculopathy.   - Pain is less when laying down but worsens when lying on the right side; no history of injury.  - Physical exam reveals tenderness in the right hip and lower back.  - X-ray of the right hip significant for arthritis.  - Patient will start meloxicam 15 mg daily. She was advised ice, heat, diclofenac gel, Icyhot, Biofreeze. She will not take Advil while she is taking meloxicam.  - Referral placed to physiatry for further evaluation of her right hip and right lower back pain today.     2. Elevated blood pressure.  Patient does have an elevated blood pressure reading in the office today.  Red flag symptoms discussed, and when to present to the ER. Patient was advised to follow up with their PCP for further evaluation and management of this chronic condition.    Shared decision-making was utilized  with patient for treatment plan. Medication discussed included indication for use and the potential benefits and side effects. Education was provided regarding the aforementioned assessments.  Differential Diagnosis, natural history, and supportive care discussed. All of the patient's questions were answered to their satisfaction at the time of discharge. Patient was encouraged to monitor symptoms closely. Those signs and symptoms which would warrant concern and mandate seeking a higher level of service through the emergency department discussed at length.  Patient stated agreement and understanding of this plan of care.    Disposition:  Home in stable condition     Voice Recognition Disclaimer:  Portions of this document were created using voice recognition software and [employer] technology provided by [employer]. The software does have a chance of producing errors of grammar and possibly content. I have made every reasonable attempt to correct obvious errors, but there may be errors of grammar and possibly content that I did not discover before finalizing the  documentation.    CYNTHIA Martinez          [1]   Current Outpatient Medications on File Prior to Visit   Medication Sig Dispense Refill    calcium-vitamin D 250-3.125 MG-MCG Tab tablet Take 1 Tablet by mouth every day. (Patient not taking: Reported on 7/30/2025)      diclofenac sodium (VOLTAREN) 1 % Gel Apply 2 g topically 4 times a day as needed (foot pain). (Patient not taking: Reported on 7/30/2025) 50 g 0     No current facility-administered medications on file prior to visit.